# Patient Record
Sex: FEMALE | Race: WHITE | Employment: OTHER | URBAN - METROPOLITAN AREA
[De-identification: names, ages, dates, MRNs, and addresses within clinical notes are randomized per-mention and may not be internally consistent; named-entity substitution may affect disease eponyms.]

---

## 2020-09-01 LAB — DIABETIC RETINOPATHY: NEGATIVE

## 2021-03-17 LAB
AVERAGE GLUCOSE: ABNORMAL
HBA1C MFR BLD: 7.7 %

## 2021-04-08 DIAGNOSIS — E11.42 DIABETIC PERIPHERAL NEUROPATHY (HCC): ICD-10-CM

## 2021-04-08 DIAGNOSIS — Z29.9 DVT PROPHYLAXIS: ICD-10-CM

## 2021-04-08 DIAGNOSIS — E11.9 DIABETES MELLITUS WITH NO COMPLICATION (HCC): ICD-10-CM

## 2021-04-08 PROBLEM — E78.5 HYPERLIPIDEMIA: Status: ACTIVE | Noted: 2021-04-08

## 2021-04-08 PROBLEM — E55.9 VITAMIN D DEFICIENCY: Status: ACTIVE | Noted: 2021-04-08

## 2021-04-08 PROBLEM — R20.9 SENSATION OF COLD IN LOWER EXTREMITY: Status: ACTIVE | Noted: 2020-02-03

## 2021-04-08 PROBLEM — F41.9 ANXIETY: Status: ACTIVE | Noted: 2021-04-08

## 2021-04-08 PROBLEM — G47.00 INSOMNIA: Status: ACTIVE | Noted: 2021-04-08

## 2021-04-08 PROBLEM — I10 HYPERTENSION: Status: ACTIVE | Noted: 2021-04-08

## 2021-04-08 PROBLEM — T78.40XA ALLERGIES: Status: ACTIVE | Noted: 2021-04-08

## 2021-04-08 PROBLEM — E83.52 HYPERCALCEMIA: Status: ACTIVE | Noted: 2020-02-17

## 2021-04-08 PROBLEM — R32 URINARY INCONTINENCE: Status: ACTIVE | Noted: 2021-04-08

## 2021-04-08 PROBLEM — M19.90 OSTEOARTHROSIS: Status: ACTIVE | Noted: 2021-04-08

## 2021-04-08 RX ORDER — LATANOPROST 50 UG/ML
1 SOLUTION/ DROPS OPHTHALMIC NIGHTLY
COMMUNITY

## 2021-04-08 RX ORDER — ASPIRIN 81 MG/1
81 TABLET ORAL DAILY
COMMUNITY
End: 2022-10-04

## 2021-04-08 RX ORDER — LISINOPRIL AND HYDROCHLOROTHIAZIDE 20; 12.5 MG/1; MG/1
1 TABLET ORAL DAILY
COMMUNITY
Start: 2021-04-05 | End: 2022-10-04

## 2021-04-08 RX ORDER — FLUTICASONE PROPIONATE 50 MCG
1 SPRAY, SUSPENSION (ML) NASAL DAILY
COMMUNITY

## 2021-04-08 RX ORDER — CLONIDINE HYDROCHLORIDE 0.1 MG/1
0.1 TABLET ORAL DAILY PRN
COMMUNITY
Start: 2021-03-22 | End: 2021-04-12

## 2021-04-08 RX ORDER — MECLIZINE HYDROCHLORIDE 25 MG/1
25 TABLET ORAL PRN
COMMUNITY

## 2021-04-08 RX ORDER — IBUPROFEN 600 MG/1
600 TABLET ORAL EVERY 6 HOURS PRN
COMMUNITY
Start: 2021-04-05

## 2021-04-08 RX ORDER — CLONAZEPAM 0.5 MG/1
0.5 TABLET ORAL PRN
COMMUNITY
Start: 2020-11-16

## 2021-04-08 RX ORDER — LANCETS 33 GAUGE
EACH MISCELLANEOUS
COMMUNITY
Start: 2020-12-29 | End: 2022-03-17 | Stop reason: SDUPTHER

## 2021-04-08 RX ORDER — GLIMEPIRIDE 4 MG/1
4 TABLET ORAL 2 TIMES DAILY
COMMUNITY
End: 2021-04-12

## 2021-04-08 RX ORDER — METFORMIN HYDROCHLORIDE 500 MG/1
500 TABLET, EXTENDED RELEASE ORAL 2 TIMES DAILY
COMMUNITY
Start: 2020-06-29 | End: 2021-04-12 | Stop reason: SDUPTHER

## 2021-04-08 RX ORDER — CITALOPRAM 20 MG/1
20 TABLET ORAL DAILY
COMMUNITY
Start: 2021-04-05 | End: 2021-04-12

## 2021-04-12 ENCOUNTER — OFFICE VISIT (OUTPATIENT)
Dept: ENDOCRINOLOGY | Age: 71
End: 2021-04-12
Payer: MEDICARE

## 2021-04-12 VITALS
HEIGHT: 62 IN | OXYGEN SATURATION: 99 % | HEART RATE: 77 BPM | WEIGHT: 125.6 LBS | DIASTOLIC BLOOD PRESSURE: 98 MMHG | SYSTOLIC BLOOD PRESSURE: 160 MMHG | BODY MASS INDEX: 23.11 KG/M2

## 2021-04-12 DIAGNOSIS — I10 ESSENTIAL HYPERTENSION: ICD-10-CM

## 2021-04-12 DIAGNOSIS — E11.42 TYPE 2 DIABETES MELLITUS WITH PERIPHERAL NEUROPATHY (HCC): ICD-10-CM

## 2021-04-12 DIAGNOSIS — E78.5 DYSLIPIDEMIA ASSOCIATED WITH TYPE 2 DIABETES MELLITUS (HCC): ICD-10-CM

## 2021-04-12 DIAGNOSIS — E11.69 DYSLIPIDEMIA ASSOCIATED WITH TYPE 2 DIABETES MELLITUS (HCC): ICD-10-CM

## 2021-04-12 DIAGNOSIS — E11.65 UNCONTROLLED TYPE 2 DIABETES MELLITUS WITH HYPERGLYCEMIA (HCC): Primary | ICD-10-CM

## 2021-04-12 DIAGNOSIS — Z78.9 STATIN INTOLERANCE: ICD-10-CM

## 2021-04-12 DIAGNOSIS — E83.52 HYPERCALCEMIA: ICD-10-CM

## 2021-04-12 PROCEDURE — 99204 OFFICE O/P NEW MOD 45 MIN: CPT | Performed by: INTERNAL MEDICINE

## 2021-04-12 PROCEDURE — 3051F HG A1C>EQUAL 7.0%<8.0%: CPT | Performed by: INTERNAL MEDICINE

## 2021-04-12 RX ORDER — GLIPIZIDE 5 MG/1
5 TABLET ORAL
Qty: 60 TABLET | Refills: 3 | Status: SHIPPED | OUTPATIENT
Start: 2021-04-12 | End: 2021-08-16

## 2021-04-12 RX ORDER — METFORMIN HYDROCHLORIDE 500 MG/1
1000 TABLET, EXTENDED RELEASE ORAL 2 TIMES DAILY WITH MEALS
Qty: 120 TABLET | Refills: 5 | Status: SHIPPED | OUTPATIENT
Start: 2021-04-12 | End: 2021-10-21

## 2021-04-12 NOTE — PROGRESS NOTES
Patient ID:   So Conner is a 65 y.o. female    Chief Complaint:   So Conner presents for an initial evaluation of Type 2 Diabetes Mellitus , Hyperlipidemia and hypertension. Subjective:   Type 2 Diabetes Mellitus diagnosed in . Never been on insulin   Invokana caused confusion   Acots confused confusion   Januvia caused confusion     Not happy with diabetes care with pcp   Recently fired her PCP     Does not want to take insulin     Metformin ER 500mg with breakfast and dinner    Glimepiride 4mg twice a day     Checks blood sugars 2 times per day. Reviewed/Reported  AM: 130-158  Lunch:  Supper:   HS: 130-163    Hypoglycemias: None     Meals: three (dinner is bigger), crackers at bedtime. Snacks 2 per day (crackers, sweets (sugar free cookies), pizza rolls). Sometimes glucerna drinks for snacks. Drinks juices sometimes. Sometimes she has diet sodas. Exercise: She says she is always active. Denies chest pain, exertional dyspnea. Family history of CAD: Brother  of MI at ag 64. Both parents had stroke. Denies smoking/ alcohol.    Currently on ASA 81 mg daily     The following portions of the patient's history were reviewed and updated as appropriate:       Family History   Problem Relation Age of Onset    High Blood Pressure Mother     Stroke Mother     Stroke Father          Social History     Socioeconomic History    Marital status: Unknown     Spouse name: Not on file    Number of children: Not on file    Years of education: Not on file    Highest education level: Not on file   Occupational History    Not on file   Social Needs    Financial resource strain: Not on file    Food insecurity     Worry: Not on file     Inability: Not on file    Transportation needs     Medical: Not on file     Non-medical: Not on file   Tobacco Use    Smoking status: Never Smoker    Smokeless tobacco: Never Used   Substance and Sexual Activity    Alcohol use: Not Currently    Drug use: Never    Sexual activity: Not on file   Lifestyle    Physical activity     Days per week: Not on file     Minutes per session: Not on file    Stress: Not on file   Relationships    Social connections     Talks on phone: Not on file     Gets together: Not on file     Attends Advent service: Not on file     Active member of club or organization: Not on file     Attends meetings of clubs or organizations: Not on file     Relationship status: Not on file    Intimate partner violence     Fear of current or ex partner: Not on file     Emotionally abused: Not on file     Physically abused: Not on file     Forced sexual activity: Not on file   Other Topics Concern    Not on file   Social History Narrative    Not on file       Past Medical History:   Diagnosis Date    Anemia     Anxiety     Biliary colic     Diabetes [facility] complication [facility]     Diabetic peripheral neuropathy ([facility]     DVT prophylaxis     Gait abnormality     Glaucoma     History of calculus of gallbladder     Hypercalcemia     Hyperlipidemia     Hypertension     Hypertriglyceridemia     Insomnia     Osteoarthritis     Pelvic prolapse     PVD [facility]peripheral vascular disease) ([facility]     Sensation of cold in lower extremity     Stress bladder incontinence, female     Urinary incontinence     Urinary incontinence     Vitamin D deficiency        Past Surgical History:   Procedure Laterality Date    BLADDER SURGERY      CHOLECYSTECTOMY      DENTAL SURGERY      Root Canal    PARTIAL HYSTERECTOMY      VAGINA SURGERY           Allergies   Allergen Reactions    Latex Rash     red spots, no hives    Statins Hives, Shortness Of Breath and Swelling    Trazodone Hives    Canagliflozin      Other reaction(s): Other (See Comments)  Confusion    Escitalopram Hives    Ezetimibe Hives    Gabapentin      Other reaction(s):  Other (See Comments)  confusion    Lamotrigine Hives    Naltrexone Hives    Perphenazine      Other reaction(s): Other (See Comments)  unsure    Pioglitazone      Other reaction(s): Other (See Comments)  confusion    Pitavastatin Hives    Prednisone Hives    Sitagliptin      Other reaction(s): Other (See Comments)  confusion    Sulfamethoxazole-Trimethoprim Itching         Current Outpatient Medications:     metFORMIN (GLUCOPHAGE-XR) 500 MG extended release tablet, Take 2 tablets by mouth 2 times daily (with meals), Disp: 120 tablet, Rfl: 5    glipiZIDE (GLUCOTROL) 5 MG tablet, Take 1 tablet by mouth 2 times daily (before meals), Disp: 60 tablet, Rfl: 3    Multiple Vitamin (MULTIVITAMINS PO), Take by mouth daily, Disp: , Rfl:     Lancets (ONETOUCH DELICA PLUS [id number]) MISC, USE TO TEST TWO TO THREE TIMES A DAY, Disp: , Rfl:     aspirin 81 MG EC tablet, Take 81 mg by mouth daily, Disp: , Rfl:     Cholecalciferol 50 MCG (2000 UT) TABS, Take 1 capsule by mouth daily, Disp: , Rfl:     clonazePAM (KLONOPIN) 0.5 MG tablet, Take 0.5 mg by mouth as needed. , Disp: , Rfl:     fluticasone (FLONASE) 50 MCG/ACT nasal spray, 1 spray by Nasal route daily, Disp: , Rfl:     ibuprofen (ADVIL;MOTRIN) 600 MG tablet, Take 600 mg by mouth every 6 hours as needed, Disp: , Rfl:     latanoprost (XALATAN) 0.005 % ophthalmic solution, Apply 1 drop to eye nightly, Disp: , Rfl:     lisinopril-hydroCHLOROthiazide (PRINZIDE;ZESTORETIC) 20-12.5 MG per tablet, Take 1 tablet by mouth daily, Disp: , Rfl:     meclizine (ANTIVERT) 25 MG tablet, Take 25 mg by mouth as needed, Disp: , Rfl:     Melatonin 3 MG CAPS, Take 3 mg by mouth nightly, Disp: , Rfl:     nystatin (MYCOSTATIN) 095573 UNIT/ML suspension, Take 5 mLs by mouth 3 times daily, Disp: , Rfl:       Review of Systems:    Constitutional: Negative for fever, chills, and unexpected weight change. HENT: Negative for congestion, ear pain, rhinorrhea,  sore throat and trouble swallowing. Eyes: Negative for photophobia, redness, itching.    Respiratory: Negative for cough, shortness of breath and sputum. Cardiovascular: Negative for chest pain, palpitations and leg swelling. Gastrointestinal: Negative for nausea, vomiting, abdominal pain, diarrhea, constipation. Endocrine: Negative for cold intolerance, heat intolerance, polydipsia, polyphagia and polyuria. Genitourinary: Negative for dysuria, urgency, frequency, hematuria and flank pain. Musculoskeletal: Negative for myalgias, back pain, arthralgias and neck pain. Skin/Nail: Negative for rash, itching. Normal nails. Neurological: Negative for seizures, weakness, light-headedness, numbness and headaches. Hematological/ Lymph nodes: Negative for adenopathy. Does not bruise/bleed easily. Psychiatric/Behavioral: Has depression. Negative for suicidal ideas, anxiety, sleep disturbance and decreased concentration. Objective:   Physical Exam:  BP (!) 179/84 (Site: Left Upper Arm, Position: Sitting, Cuff Size: Small Adult)   Pulse 77   Ht 5' 2\" (1.575 m)   Wt 125 lb 9.6 oz (57 kg)   SpO2 99%   Breastfeeding No   BMI 22.97 kg/m²   Constitutional: Patient is oriented to person, place, and time. Patient appears well-developed and well-nourished. HENT:    Head: Normocephalic and atraumatic. Eyes: Conjunctivae and EOM are normal. Pupils are equal, round, and reactive to light. Neck: Normal range of motion. Cardiovascular: Normal rate, regular rhythm and normal heart sounds. Pulmonary/Chest: Effort normal and breath sounds normal.   Abdominal: Soft. Bowel sounds are normal.   Musculoskeletal: Normal range of motion. Neurological: Patient is alert and oriented to person, place, and time. Patient has normal reflexes. Skin: Skin is warm and dry. Psychiatric: Patient has a normal mood and affect.  Patient behavior is normal.     Lab Review:    Office Visit on 04/12/2021   Component Date Value Ref Range Status    Diabetic Retinopathy 09/01/2020 Negative   Final   Abstract on 04/08/2021   Component Date Value Ref Range Status    Hemoglobin A1C 03/17/2021 7.7  % Final           Assessment and Plan     Juventino Snowden was seen today for consultation and diabetes. Diagnoses and all orders for this visit:    Uncontrolled type 2 diabetes mellitus [facility] hypergly[facility]  -     metFORMIN (GLUCOPHAGE-XR) 500 MG extended release tablet; Take 2 tablets by mouth 2 times daily (with meals)  -     glipiZIDE (GLUCOTROL) 5 MG tablet; Take 1 tablet by mouth 2 times daily (before meals)  -     Comprehensive Metabolic Panel; Future  -     Vitamin D 25 Hydroxy; Future  -     PTH, Intact; Future  -     Microalbumin / Creatinine Urine Ratio; Future  -     Vitamin B12 & Folate; Future    Dyslipidemia associated with type 2 diabetes mellitus ([facility]    Essential hypertension  -     metFORMIN (GLUCOPHAGE-XR) 500 MG extended release tablet; Take 2 tablets by mouth 2 times daily (with meals)  -     glipiZIDE (GLUCOTROL) 5 MG tablet; Take 1 tablet by mouth 2 times daily (before meals)  -     Comprehensive Metabolic Panel; Future  -     Vitamin D 25 Hydroxy; Future  -     PTH, Intact; Future  -     Microalbumin / Creatinine Urine Ratio; Future  -     Vitamin B12 & Folate; Future    Statin intolerance    Hypercalcemia  -     metFORMIN (GLUCOPHAGE-XR) 500 MG extended release tablet; Take 2 tablets by mouth 2 times daily (with meals)  -     glipiZIDE (GLUCOTROL) 5 MG tablet; Take 1 tablet by mouth 2 times daily (before meals)  -     Comprehensive Metabolic Panel; Future  -     Vitamin D 25 Hydroxy; Future  -     PTH, Intact; Future  -     Microalbumin / Creatinine Urine Ratio; Future  -     Vitamin B12 & Folate; Future    Type 2 diabetes mellitus with peripheral neuropathy (HCC)  -     metFORMIN (GLUCOPHAGE-XR) 500 MG extended release tablet; Take 2 tablets by mouth 2 times daily (with meals)  -     glipiZIDE (GLUCOTROL) 5 MG tablet; Take 1 tablet by mouth 2 times daily (before meals)  -     Comprehensive Metabolic Panel;  Future  -     Vitamin D

## 2021-04-28 PROBLEM — R19.7 DIARRHEA: Status: ACTIVE | Noted: 2020-06-24

## 2021-05-07 ENCOUNTER — VIRTUAL VISIT (OUTPATIENT)
Dept: ENDOCRINOLOGY | Age: 71
End: 2021-05-07
Payer: MEDICARE

## 2021-05-07 DIAGNOSIS — E78.5 DYSLIPIDEMIA ASSOCIATED WITH TYPE 2 DIABETES MELLITUS (HCC): ICD-10-CM

## 2021-05-07 DIAGNOSIS — E11.65 UNCONTROLLED TYPE 2 DIABETES MELLITUS WITH HYPERGLYCEMIA (HCC): Primary | ICD-10-CM

## 2021-05-07 DIAGNOSIS — I10 ESSENTIAL HYPERTENSION: ICD-10-CM

## 2021-05-07 DIAGNOSIS — E11.69 DYSLIPIDEMIA ASSOCIATED WITH TYPE 2 DIABETES MELLITUS (HCC): ICD-10-CM

## 2021-05-07 PROCEDURE — 99442 PR PHYS/QHP TELEPHONE EVALUATION 11-20 MIN: CPT | Performed by: INTERNAL MEDICINE

## 2021-05-07 NOTE — PROGRESS NOTES
Mother     Stroke Father          Social History     Socioeconomic History    Marital status: Unknown     Spouse name: Not on file    Number of children: Not on file    Years of education: Not on file    Highest education level: Not on file   Occupational History    Not on file   Social Needs    Financial resource strain: Not on file    Food insecurity     Worry: Not on file     Inability: Not on file    Transportation needs     Medical: Not on file     Non-medical: Not on file   Tobacco Use    Smoking status: Never Smoker    Smokeless tobacco: Never Used   Substance and Sexual Activity    Alcohol use: Not Currently    Drug use: Never    Sexual activity: Not on file   Lifestyle    Physical activity     Days per week: Not on file     Minutes per session: Not on file    Stress: Not on file   Relationships    Social connections     Talks on phone: Not on file     Gets together: Not on file     Attends Yazdanism service: Not on file     Active member of club or organization: Not on file     Attends meetings of clubs or organizations: Not on file     Relationship status: Not on file    Intimate partner violence     Fear of current or ex partner: Not on file     Emotionally abused: Not on file     Physically abused: Not on file     Forced sexual activity: Not on file   Other Topics Concern    Not on file   Social History Narrative    Not on file       Past Medical History:   Diagnosis Date    Anemia     Anxiety     Biliary colic     [facility] mellitus with no complication [facility]     Diabetic peripheral neuropathy ([facility]     DVT prophylaxis     Gait abnormality     Glaucoma     History of calculus of gallbladder     Hypercalcemia     Hyperlipidemia     Hypertension     Hypertriglyceridemia     Insomnia     Osteoarthritis     Pelvic prolapse     PVD (peripheral vascular disease) ([facility]     Sensation of cold in lower extremity     Stress bladder incontinence, female     Urinary incontinence     Urinary incontinence     Vitamin D deficiency        Past Surgical History:   Procedure Laterality Date    BLADDER SURGERY      CHOLECYSTECTOMY      DENTAL SURGERY      Root Canal    PARTIAL HYSTERECTOMY      VAGINA SURGERY           Allergies   Allergen Reactions    Latex Rash     red spots, no hives    Statins Hives, Shortness Of Breath and Swelling    Trazodone Hives    Canagliflozin      Other reaction(s): Other (See Comments)  Confusion    Escitalopram Hives    Ezetimibe Hives    Gabapentin      Other reaction(s): Other (See Comments)  confusion    Lamotrigine Hives    Naltrexone Hives    Perphenazine      Other reaction(s): Other (See Comments)  unsure    Pioglitazone      Other reaction(s): Other (See Comments)  confusion    Pitavastatin Hives    Prednisone Hives    Sitagliptin      Other reaction(s): Other (See Comments)  confusion    Sulfamethoxazole-Trimethoprim Itching         Current Outpatient Medications:     metFORMIN (GLUCOPHAGE-XR) 500 MG extended release tablet, Take 2 tablets by mouth 2 times daily (with meals), Disp: 120 tablet, Rfl: 5    glipiZIDE (GLUCOTROL) 5 MG tablet, Take 1 tablet by mouth 2 times daily (before meals), Disp: 60 tablet, Rfl: 3    Multiple Vitamin (MULTIVITAMINS PO), Take by mouth daily, Disp: , Rfl:     Lancets (ONETOUCH DELICA PLUS [id number]) MISC, USE TO TEST TWO TO THREE TIMES A DAY, Disp: , Rfl:     aspirin 81 MG EC tablet, Take 81 mg by mouth daily, Disp: , Rfl:     Cholecalciferol 50 MCG (2000 UT) TABS, Take 1 capsule by mouth daily, Disp: , Rfl:     clonazePAM (KLONOPIN) 0.5 MG tablet, Take 0.5 mg by mouth as needed.  , Disp: , Rfl:     fluticasone (FLONASE) 50 MCG/ACT nasal spray, 1 spray by Nasal route daily, Disp: , Rfl:     ibuprofen (ADVIL;MOTRIN) 600 MG tablet, Take 600 mg by mouth every 6 hours as needed, Disp: , Rfl:     latanoprost (XALATAN) 0.005 % ophthalmic solution, Apply 1 drop to eye nightly, Disp: , Rfl:   lisinopril-hydroCHLOROthiazide (PRINZIDE;ZESTORETIC) 20-12.5 MG per tablet, Take 1 tablet by mouth daily, Disp: , Rfl:     meclizine (ANTIVERT) 25 MG tablet, Take 25 mg by mouth as needed, Disp: , Rfl:     Melatonin 3 MG CAPS, Take 3 mg by mouth nightly, Disp: , Rfl:     nystatin (MYCOSTATIN) [id number] UNIT/ML suspension, Take 5 mLs by mouth 3 times daily, Disp: , Rfl:       Review of Systems:    Constitutional: Negative for fever, chills, and unexpected weight change. HENT: Negative for congestion, ear pain, rhinorrhea,  sore throat and trouble swallowing. Eyes: Negative for photophobia, redness, itching. Respiratory: Negative for cough, shortness of breath and sputum. Cardiovascular: Negative for chest pain, palpitations and leg swelling. Gastrointestinal: Negative for nausea, vomiting, abdominal pain, diarrhea, constipation. Endocrine: Negative for cold intolerance, heat intolerance, polydipsia, polyphagia and polyuria. Genitourinary: Negative for dysuria, urgency, frequency, hematuria and flank pain. Musculoskeletal: Negative for myalgias, back pain, arthralgias and neck pain. Skin/Nail: Negative for rash, itching. Normal nails. Neurological: Negative for seizures, weakness, light-headedness, numbness and headaches. Hematological/ Lymph nodes: Negative for adenopathy. Does not bruise/bleed easily. Psychiatric/Behavioral: Has depression. Negative for suicidal ideas, anxiety, sleep disturbance and decreased concentration. Objective:   Physical Exam:  There were no vitals taken for this visit. Lab Review:    Office Visit on 04/12/2021   Component Date Value Ref Range Status    Diabetic Retinopathy 09/01/2020 Negative   Final   Abstract on 04/08/2021   Component Date Value Ref Range Status    Hemoglobin A1C 03/17/2021 7.7  % Final           Assessment and Plan     Brittany Nelson was seen today for diabetes.     Diagnoses and all orders for this visit:    Uncontrolled type 2 diabetes mellitus with hyperglycemia (HCC)  -     Hemoglobin A1C; Future    Dyslipidemia associated with type 2 diabetes mellitus ([facility]    Essential hypertension          1: Type 2 DM complicated with hyperglycemia   Uncontrolled A1C 7.7% March 17th 2021      Blood sugars look in a decent range   She made improvements in her diet     C/w Metformin ER 500mg, two tabs with breakfast and dinner    Glipizide 5 mg with breakfast and dinner    All instructions provided in written. Check Blood sugars 2 times per day. Log them along with insulin and send them every 2 weeks. Call for blood sugars less than 60 or more than 400. Eye exam: Everything 3 months denies DR. Brigette Joel Glaucoma. Foot exam:  April 2021 . Neuropathic pain at night. Deformity/amputation: absent  Skin lesions/pre-ulcerative calluses: absent  Edema: right- negative, left- negative  Sensory exam: Monofilament sensation: normal  Pulses: normal, Vibration (128 Hz): severely reduced     April 2021: Normal B12 and folate for neuropathy     Renal screen: later   TSH screen:March 2021     2: HTN   Controlled     3: Hyperlipidemia   LDL: 161 , HDL: 52 , TGs: 187 March 2021    allergic to statins (crestor, pitavastatin pravachol) and zetia   Does not want tot take statins     4: Hypercalcemia   Noted in the labs   Never had kidney stones   10.1 -11.4 since Jan 2020   Ca: 10.8, PTH 23 April 2021   Vit D 71 - April 2021 . She takes Vit D 2000 units twice a day. Change it once a day. She is on HCTZ 12.5mg   Takes Vit D 4,000 units daily     Check CMP in 6 months , fall - winter 2021     RTC in 6 weeks with logs , A1C   She does labs at [facility]     A total of 15 minutes was spent conversing with the patient and over half of that time was spent counseling the patient on endocrine related medical issues.        EDUCATION:   Greater than 50% of this visit was spent in general counseling regarding obesity, diet, exercise, importance of adherence to insulin regime, recognition and treatment of hypo and hyperglycemia,  glucose logging, proper diabetes management, diabetic complications with poor management and the importance of glycemic control in order to avoid the complications of diabetes. Risks and potential complications of diabetes were reviewed with the patient. Diabetes health maintenance plan and follow-up were discussed and understood by the patient. We reviewed the importance of medication compliance and regular follow-up. Aggressive lifestyle modification was encouraged. Exercise Counselling: This patient is a candidate for regular physical exercise. Instructions to perform the following types of exercise:  Swimming or water aerobic exercise  Brisk walking  Playing tennis  Stationary bicycle or elliptical indoor  Low impact aerobic exercise    Instructions given to exercise for the following duration:  30 minutes a day for five-seven days per week.     Following instructions for being active throughout the day in addition to formal exercise:  Walk instead of drive whenever possible  Take the stairs instead of the elevator  Work in the garden  Park to the far end of the parking lot to add more walking steps to destination      Electronically signed by Rusty Evans MD on 5/7/2021 at 12:18 PM

## 2021-07-02 ENCOUNTER — OFFICE VISIT (OUTPATIENT)
Dept: ENDOCRINOLOGY | Age: 71
End: 2021-07-02
Payer: MEDICARE

## 2021-07-02 VITALS
OXYGEN SATURATION: 97 % | TEMPERATURE: 97.2 F | HEIGHT: 62 IN | RESPIRATION RATE: 14 BRPM | WEIGHT: 121.6 LBS | DIASTOLIC BLOOD PRESSURE: 83 MMHG | SYSTOLIC BLOOD PRESSURE: 147 MMHG | HEART RATE: 80 BPM | BODY MASS INDEX: 22.38 KG/M2

## 2021-07-02 DIAGNOSIS — E11.69 DYSLIPIDEMIA ASSOCIATED WITH TYPE 2 DIABETES MELLITUS (HCC): ICD-10-CM

## 2021-07-02 DIAGNOSIS — I10 ESSENTIAL HYPERTENSION: ICD-10-CM

## 2021-07-02 DIAGNOSIS — E78.5 DYSLIPIDEMIA ASSOCIATED WITH TYPE 2 DIABETES MELLITUS (HCC): ICD-10-CM

## 2021-07-02 DIAGNOSIS — E11.65 UNCONTROLLED TYPE 2 DIABETES MELLITUS WITH HYPERGLYCEMIA (HCC): Primary | ICD-10-CM

## 2021-07-02 DIAGNOSIS — Z78.9 STATIN INTOLERANCE: ICD-10-CM

## 2021-07-02 DIAGNOSIS — E83.52 HYPERCALCEMIA: ICD-10-CM

## 2021-07-02 PROCEDURE — 99214 OFFICE O/P EST MOD 30 MIN: CPT | Performed by: INTERNAL MEDICINE

## 2021-07-02 PROCEDURE — 3051F HG A1C>EQUAL 7.0%<8.0%: CPT | Performed by: INTERNAL MEDICINE

## 2021-07-02 RX ORDER — CYCLOBENZAPRINE HCL 10 MG
10 TABLET ORAL DAILY PRN
COMMUNITY
Start: 2021-06-28 | End: 2021-11-16

## 2021-07-02 NOTE — PROGRESS NOTES
 Not on file   Social History Narrative    Not on file     Social Determinants of Health     Financial Resource Strain:     Difficulty of Paying Living Expenses:    Food Insecurity:     Worried About Running Out of Food in the Last Year:     [address] in the Last Year:    Transportation Needs:     Lack of Transportation (Medical):      Lack of Transportation (Non-Medical):    Physical Activity:     Days of Exercise per Week:     Minutes of Exercise per Session:    Stress:     Feeling of Stress :    Social Connections:     Frequency of Communication with Friends and Family:     Frequency of Social Gatherings with Friends and Family:     Attends Rastafari Services:     Active Member of Clubs or Organizations:     Attends Club or Organization Meetings:     Marital Status:    Intimate Partner Violence:     Fear of Current or Ex-Partner:     Emotionally Abused:     Physically Abused:     Sexually Abused:        Past Medical History:   Diagnosis Date    Anemia     Anxiety     Biliary colic     Diabetes mellitus with no complication ([address]     Diabetic peripheral [facility]     DVT prophylaxis     Gait abnormality     Glaucoma     History of calculus of gallbladder     Hypercalcemia     Hyperlipidemia     Hypertension     Hypertriglyceridemia     Insomnia     Osteoarthritis     Pelvic prolapse     PVD (peripheral vascular disease) ([facility]     Sensation of cold in lower extremity     Stress bladder incontinence, female     Urinary incontinence     Urinary incontinence     Vitamin D deficiency        Past Surgical History:   Procedure Laterality Date    BLADDER SURGERY      CHOLECYSTECTOMY      DENTAL SURGERY      Root Canal    PARTIAL HYSTERECTOMY      VAGINA SURGERY           Allergies   Allergen Reactions    Latex Rash     red spots, no hives    Statins Hives, Shortness Of Breath, Swelling and Anaphylaxis    Trazodone Hives    Canagliflozin      Other reaction(s): Other (See Comments)  Confusion    Escitalopram Hives    Ezetimibe Hives    Gabapentin      Other reaction(s): Other (See Comments)  confusion    Lamotrigine Hives    Naltrexone Hives    Other      Cigarette smoke    Perphenazine      Other reaction(s): Other (See Comments)  unsure    Pioglitazone      Other reaction(s): Other (See Comments)  confusion    Pitavastatin Hives    Prednisone Hives    Sitagliptin      Other reaction(s): Other (See Comments)  confusion    Sulfamethoxazole-Trimethoprim Itching         Current Outpatient Medications:     cyclobenzaprine (FLEXERIL) 10 MG tablet, Take 10 mg by mouth daily as needed, Disp: , Rfl:     metFORMIN (GLUCOPHAGE-XR) 500 MG extended release tablet, Take 2 tablets by mouth 2 times daily (with meals), Disp: 120 tablet, Rfl: 5    glipiZIDE (GLUCOTROL) 5 MG tablet, Take 1 tablet by mouth 2 times daily (before meals), Disp: 60 tablet, Rfl: 3    Multiple Vitamin (MULTIVITAMINS PO), Take by mouth daily, Disp: , Rfl:     Lancets (ONETOUCH DELICA PLUS CFUPBF25E) MISC, USE TO TEST TWO TO THREE TIMES A DAY, Disp: , Rfl:     aspirin 81 MG EC tablet, Take 81 mg by mouth daily, Disp: , Rfl:     Cholecalciferol 50 MCG (2000 UT) TABS, Take 1 capsule by mouth daily, Disp: , Rfl:     clonazePAM (KLONOPIN) 0.5 MG tablet, Take 0.5 mg by mouth as needed.  , Disp: , Rfl:     fluticasone (FLONASE) 50 MCG/ACT nasal spray, 1 spray by Nasal route daily, Disp: , Rfl:     ibuprofen (ADVIL;MOTRIN) 600 MG tablet, Take 600 mg by mouth every 6 hours as needed, Disp: , Rfl:     latanoprost (XALATAN) 0.005 % ophthalmic solution, Apply 1 drop to eye nightly, Disp: , Rfl:     lisinopril-hydroCHLOROthiazide (PRINZIDE;ZESTORETIC) 20-12.5 MG per tablet, Take 1 tablet by mouth daily, Disp: , Rfl:     meclizine (ANTIVERT) 25 MG tablet, Take 25 mg by mouth as needed, Disp: , Rfl:     Melatonin 3 MG CAPS, Take 3 mg by mouth nightly, Disp: , Rfl:     nystatin (MYCOSTATIN) 682929 UNIT/ML suspension, Take 5 mLs by mouth 3 times daily, Disp: , Rfl:       Review of Systems:    Constitutional: Negative for fever, chills, and unexpected weight change. HENT: Negative for congestion, ear pain, rhinorrhea,  sore throat and trouble swallowing. Eyes: Negative for photophobia, redness, itching. Respiratory: Negative for cough, shortness of breath and sputum. Cardiovascular: Negative for chest pain, palpitations and leg swelling. Gastrointestinal: Negative for nausea, vomiting, abdominal pain, diarrhea, constipation. Endocrine: Negative for cold intolerance, heat intolerance, polydipsia, polyphagia and polyuria. Genitourinary: Negative for dysuria, urgency, frequency, hematuria and flank pain. Musculoskeletal: Negative for myalgias, back pain, arthralgias and neck pain. Skin/Nail: Negative for rash, itching. Normal nails. Neurological: Negative for seizures, weakness, light-headedness, numbness and headaches. Hematological/ Lymph nodes: Negative for adenopathy. Does not bruise/bleed easily. Psychiatric/Behavioral: Has depression. Negative for suicidal ideas, anxiety, sleep disturbance and decreased concentration. Objective:   Physical Exam:  BP (!) 147/83   Pulse 80   Temp 97.2 °F (36.2 °C)   Resp 14   Ht 5' 2\" (1.575 m)   Wt 121 lb 9.6 oz (55.2 kg)   SpO2 97%   BMI 22.24 kg/m²       Lab Review:    Office Visit on 04/12/2021   Component Date Value Ref Range Status    Diabetic Retinopathy 09/01/2020 Negative   Final   Abstract on 04/08/2021   Component Date Value Ref Range Status    Hemoglobin A1C 03/17/2021 7.7  % Final           Assessment and Plan     Gisela Steele was seen today for diabetes. Diagnoses and all orders for this visit:    Uncontrolled type 2 diabetes mellitus with hyperglycemia ([facility]  -     Hemoglobin A1C; Future  -     Comprehensive Metabolic Panel; Future  -     Microalbumin / Creatinine Urine Ratio;  Future  -     Vitamin D 25 Hydroxy; Future  -     PTH, Intact; Future    Dyslipidemia associated with type 2 diabetes mellitus ([facility]  -     Hemoglobin A1C; Future  -     Comprehensive Metabolic Panel; Future  -     Microalbumin / Creatinine Urine Ratio; Future  -     Vitamin D 25 Hydroxy; Future  -     PTH, Intact; Future    Essential hypertension  -     Hemoglobin A1C; Future  -     Comprehensive Metabolic Panel; Future  -     Microalbumin / Creatinine Urine Ratio; Future  -     Vitamin D 25 Hydroxy; Future  -     PTH, Intact; Future    Statin intolerance  -     Hemoglobin A1C; Future  -     Comprehensive Metabolic Panel; Future  -     Microalbumin / Creatinine Urine Ratio; Future  -     Vitamin D 25 Hydroxy; Future  -     PTH, Intact; Future    Hypercalcemia  -     Hemoglobin A1C; Future  -     Comprehensive Metabolic Panel; Future  -     Microalbumin / Creatinine Urine Ratio; Future  -     Vitamin D 25 Hydroxy; Future  -     PTH, Intact; Future          1: Type 2 DM complicated with hyperglycemia   Controlled A1C 7.5% < 7.7% March 17th 2021      Blood sugars look in a decent range   Some numbers are high in am  May go up on evening dose of glipizide later     C/w Metformin ER 500mg, two tabs with breakfast and dinner    Glipizide 5 mg with breakfast and dinner    All instructions provided in written. Check Blood sugars 2 times per day. Log them along with insulin and send them every 2 weeks. Call for blood sugars less than 60 or more than 400. Eye exam: Everything 3 months denies DR. Aydin Benites Glaucoma. Foot exam:  April 2021 . Neuropathic pain at night.    Deformity/amputation: absent  Skin lesions/pre-ulcerative calluses: absent  Edema: right- negative, left- negative  Sensory exam: Monofilament sensation: normal  Pulses: normal, Vibration (128 Hz): severely reduced     April 2021: Normal B12 and folate for neuropathy     Renal screen: later   TSH screen:March 2021     2: HTN   [facility] as [facility]      3: Hyperlipidemia   LDL: 161 , HDL: 52 , TGs: 187 March 2021    Allergic to statins (crestor, pitavastatin pravachol) and zetia   Does not want to take statins     4: Hypercalcemia   Noted in the labs   Never had kidney stones   10.1 -11.4 since Jan 2020   Ca: 10.8, PTH 23 April 2021   Vit D 69 - April 2021 . She is on HCTZ 12.5mg   Takes Vit D 5,000 units three days a week      Check CMP in 6 months , fall - winter 2021     RTC in 4 months with logs , A1C, CMP, Vit D , PTH, MACR      She does labs at [facility]:   Greater than 50% of this visit was spent in general counseling regarding obesity, diet, exercise, importance of adherence to insulin regime, recognition and treatment of hypo and hyperglycemia,  glucose logging, proper diabetes management, diabetic complications with poor management and the importance of glycemic control in order to avoid the complications of diabetes. Risks and potential complications of diabetes were reviewed with the patient. Diabetes health maintenance plan and follow-up were discussed and understood by the patient. We reviewed the importance of medication compliance and regular follow-up. Aggressive lifestyle modification was encouraged. Exercise Counselling: This patient is a candidate for regular physical exercise. Instructions to perform the following types of exercise:  Swimming or water aerobic exercise  Brisk walking  Playing tennis  Stationary bicycle or elliptical indoor  Low impact aerobic exercise    Instructions given to exercise for the following duration:  30 minutes a day for five-seven days per week.     Following instructions for being active throughout the day in addition to formal exercise:  Walk instead of drive whenever possible  Take the stairs instead of the elevator  Work in the garden  Park to the far end of the parking lot to add more walking steps to destination      Electronically signed by Dia Valencia MD on 7/2/2021 at 11:48 AM

## 2021-10-21 DIAGNOSIS — E11.42 TYPE 2 DIABETES MELLITUS WITH PERIPHERAL NEUROPATHY (HCC): ICD-10-CM

## 2021-10-21 DIAGNOSIS — E11.65 UNCONTROLLED TYPE 2 DIABETES MELLITUS WITH HYPERGLYCEMIA (HCC): ICD-10-CM

## 2021-10-21 DIAGNOSIS — I10 ESSENTIAL HYPERTENSION: ICD-10-CM

## 2021-10-21 DIAGNOSIS — E83.52 HYPERCALCEMIA: ICD-10-CM

## 2021-10-21 RX ORDER — METFORMIN HYDROCHLORIDE 500 MG/1
TABLET, EXTENDED RELEASE ORAL
Qty: 120 TABLET | Refills: 5 | Status: SHIPPED | OUTPATIENT
Start: 2021-10-21 | End: 2022-03-17 | Stop reason: SDUPTHER

## 2021-11-01 PROBLEM — J30.2 SEASONAL ALLERGIC RHINITIS: Status: ACTIVE | Noted: 2021-06-16

## 2021-11-17 ENCOUNTER — OFFICE VISIT (OUTPATIENT)
Dept: ENDOCRINOLOGY | Age: 71
End: 2021-11-17
Payer: MEDICARE

## 2021-11-17 VITALS
HEART RATE: 84 BPM | DIASTOLIC BLOOD PRESSURE: 82 MMHG | OXYGEN SATURATION: 99 % | WEIGHT: 124 LBS | BODY MASS INDEX: 22.82 KG/M2 | SYSTOLIC BLOOD PRESSURE: 170 MMHG | HEIGHT: 62 IN

## 2021-11-17 DIAGNOSIS — E11.69 DYSLIPIDEMIA ASSOCIATED WITH TYPE 2 DIABETES MELLITUS (HCC): ICD-10-CM

## 2021-11-17 DIAGNOSIS — E83.52 HYPERCALCEMIA: ICD-10-CM

## 2021-11-17 DIAGNOSIS — E78.5 DYSLIPIDEMIA ASSOCIATED WITH TYPE 2 DIABETES MELLITUS (HCC): ICD-10-CM

## 2021-11-17 DIAGNOSIS — Z78.9 STATIN INTOLERANCE: ICD-10-CM

## 2021-11-17 DIAGNOSIS — E11.42 TYPE 2 DIABETES MELLITUS WITH PERIPHERAL NEUROPATHY (HCC): Primary | ICD-10-CM

## 2021-11-17 DIAGNOSIS — I10 ESSENTIAL HYPERTENSION: ICD-10-CM

## 2021-11-17 PROCEDURE — 3051F HG A1C>EQUAL 7.0%<8.0%: CPT | Performed by: INTERNAL MEDICINE

## 2021-11-17 PROCEDURE — 99214 OFFICE O/P EST MOD 30 MIN: CPT | Performed by: INTERNAL MEDICINE

## 2021-11-17 RX ORDER — CLONIDINE HYDROCHLORIDE 0.1 MG/1
0.1 TABLET ORAL PRN
COMMUNITY
End: 2022-10-04 | Stop reason: ALTCHOICE

## 2021-11-17 RX ORDER — ACETAMINOPHEN 160 MG
TABLET,DISINTEGRATING ORAL
COMMUNITY
End: 2022-10-04 | Stop reason: ALTCHOICE

## 2021-11-17 RX ORDER — AMLODIPINE BESYLATE 10 MG/1
10 TABLET ORAL DAILY
Qty: 90 TABLET | Refills: 1 | Status: SHIPPED | OUTPATIENT
Start: 2021-11-17 | End: 2022-03-17

## 2021-11-17 NOTE — PROGRESS NOTES
Patient ID:   Ramesh Carter is a 65 y.o. female    Chief Complaint:   Ramesh Carter presents for an evaluation of Type 2 Diabetes Mellitus , Hyperlipidemia and hypertension. Subjective:   Type 2 Diabetes Mellitus diagnosed in . Never been on insulin   Invokana caused confusion   Actos confused confusion   Nareshuvia caused confusion     Not happy with diabetes care with pcp   Recently fired her PCP     Does not want to take insulin     Metformin ER 500mg, two tabs with breakfast and dinner    Glipizide 5 mg with breakfast and dinner    She is using boost instead of snacks, breads, mac n cheese     Checks blood sugars 1.4 times per day. Reviewed meter: , [address] AM:  [id number]  Lunch:  Supper:   HS:  225     Hypoglycemias: None     Meals: three (dinner is bigger), crackers at bedtime. Snacks 2 per day (crackers, sweets (sugar free cookies), pizza rolls). Sometimes glucerna drinks for snacks. Drinks juices sometimes. Sometimes she has diet sodas. Exercise: She says she is always active. Denies chest pain, exertional dyspnea. Family history of CAD: Brother  of MI at ag 64. Both parents had stroke. Denies smoking/ alcohol.    Currently on ASA 81 mg daily     The following portions of the patient's history were reviewed and updated as appropriate:       Family History   Problem Relation Age of Onset    High Blood Pressure Mother     Stroke Mother     Stroke Father          Social History     Socioeconomic History    Marital status: Unknown     Spouse name: Not on file    Number of children: Not on file    Years of education: Not on file    Highest education level: Not on file   Occupational History    Not on file   Tobacco Use    Smoking status: Never Smoker    Smokeless tobacco: Never Used   Vaping Use    Vaping Use: Never used   Substance and Sexual Activity    Alcohol use: Not Currently    Drug use: Never    Sexual activity: Not on file   Other Topics Concern    Not on file   Social History Narrative    Not on file     Social Determinants of Health     Financial Resource Strain:     Difficulty of Paying Living Expenses: Not on file   Food Insecurity:     Worried About Running Out of Food in the Last Year: Not on file    Kassandra of Food in the Last Year: Not on file   Transportation Needs:     Lack of Transportation (Medical): Not on file    Lack of Transportation (Non-Medical):  Not on file   Physical Activity:     Days of Exercise per Week: Not on file    Minutes of Exercise per Session: Not on file   Stress:     Feeling of Stress : Not on file   Social Connections:     Frequency of Communication with Friends and Family: Not on file    Frequency of Social Gatherings with Friends and Family: Not on file    Attends Yarsani Services: Not on file    Active Member of [address] [place] [address] [employer] or Organizations: Not on file    Attends Club or Organization Meetings: Not on file    Marital Status: Not on file   Intimate Partner Violence:     Fear of Current or Ex-Partner: Not on file    Emotionally Abused: Not on file    Physically Abused: Not on file    Sexually Abused: Not on file   Housing Stability:     Unable to Pay for Housing in the Last Year: Not on file    Number of [place] in the Last Year: Not on file    Unstable Housing in the Last Year: Not on file       Past Medical History:   Diagnosis Date    Anemia     Anxiety     Biliary colic     [facility]     Diabetic peripheral neuropathy [facility]     DVT prophylaxis     Gait abnormality     Glaucoma     History of calculus of gallbladder     Hypercalcemia     Hyperlipidemia     Hypertension     Hypertriglyceridemia     Insomnia     Osteoarthritis     Pelvic prolapse     PVD (peripheral vascular disease) [facility]     Sensation of cold in lower extremity     Stress bladder incontinence, female     Urinary incontinence     Urinary incontinence     Vitamin D deficiency Past Surgical History:   Procedure Laterality Date    BLADDER SURGERY      CHOLECYSTECTOMY      DENTAL SURGERY      Root Canal    PARTIAL HYSTERECTOMY      VAGINA SURGERY           Allergies   Allergen Reactions    Latex Rash     red spots, no hives    Statins Hives, Shortness Of Breath, Swelling and Anaphylaxis    Trazodone Hives    Canagliflozin      Other reaction(s): Other (See Comments)  Confusion    Escitalopram Hives    Ezetimibe Hives    Gabapentin      Other reaction(s): Other (See Comments)  confusion    Lamotrigine Hives    Naltrexone Hives    Other      Cigarette smoke    Perphenazine      Other reaction(s): Other (See Comments)  unsure    Pioglitazone      Other reaction(s): Other (See Comments)  confusion    Pitavastatin Hives    Prednisone Hives    Sitagliptin      Other reaction(s): Other (See Comments)  confusion    Sulfamethoxazole-Trimethoprim Itching         Current Outpatient Medications:     Cholecalciferol (VITAMIN D3) 50 MCG (2000 UT) CAPS, Take by mouth Once or twice weekly, Disp: , Rfl:     cloNIDine (CATAPRES) 0.1 MG tablet, Take 0.1 mg by mouth as needed for High Blood Pressure, Disp: , Rfl:     amLODIPine (NORVASC) 10 MG tablet, Take 1 tablet by mouth daily, Disp: 90 tablet, Rfl: 1    glipiZIDE (GLUCOTROL) 5 MG tablet, TAKE ONE TABLET BY MOUTH TWICE A DAY BEFORE MEALS, Disp: 60 tablet, Rfl: 2    metFORMIN (GLUCOPHAGE-XR) 500 MG extended release tablet, TAKE TWO TABLETS BY MOUTH TWICE A DAY WITH MEALS, Disp: 120 tablet, Rfl: 5    Multiple Vitamin (MULTIVITAMINS PO), Take by mouth daily, Disp: , Rfl:     Lancets (ONETOUCH DELICA PLUS [id number]) MISC, USE TO TEST TWO TO THREE TIMES A DAY, Disp: , Rfl:     aspirin 81 MG EC tablet, Take 81 mg by mouth daily, Disp: , Rfl:     clonazePAM (KLONOPIN) 0.5 MG tablet, Take 0.5 mg by mouth as needed.  , Disp: , Rfl:     fluticasone (FLONASE) 50 MCG/ACT nasal spray, 1 spray by Nasal route daily, Disp: , Rfl:    ibuprofen (ADVIL;MOTRIN) 600 MG tablet, Take 600 mg by mouth every 6 hours as needed, Disp: , Rfl:     latanoprost (XALATAN) 0.005 % ophthalmic solution, Apply 1 drop to eye nightly, Disp: , Rfl:     lisinopril-hydroCHLOROthiazide (PRINZIDE;ZESTORETIC) 20-12.5 MG per tablet, Take 1 tablet by mouth daily, Disp: , Rfl:     meclizine (ANTIVERT) 25 MG tablet, Take 25 mg by mouth as needed, Disp: , Rfl:       Review of Systems:    Constitutional: Negative for fever, chills, and unexpected weight change. HENT: Negative for congestion, ear pain, rhinorrhea,  sore throat and trouble swallowing. Eyes: Negative for photophobia, redness, itching. Respiratory: Negative for cough, shortness of breath and sputum. Cardiovascular: Negative for chest pain, palpitations and leg swelling. Gastrointestinal: Negative for nausea, vomiting, abdominal pain, diarrhea, constipation. Endocrine: Negative for cold intolerance, heat intolerance, polydipsia, polyphagia and polyuria. Genitourinary: Negative for dysuria, urgency, frequency, hematuria and flank pain. Musculoskeletal: Negative for myalgias, back pain, arthralgias and neck pain. Skin/Nail: Negative for rash, itching. Normal nails. Neurological: Negative for seizures, weakness, light-headedness, numbness and headaches. Hematological/ Lymph nodes: Negative for adenopathy. Does not bruise/bleed easily. Psychiatric/Behavioral: Has depression. Negative for suicidal ideas, anxiety, sleep disturbance and decreased concentration.           Objective:   Physical Exam:  BP (!) 180/88 (Site: Right Upper Arm, Position: Sitting, Cuff Size: Medium Adult)   Pulse 84   Ht 5' 2\" (1.575 m)   Wt 124 lb (56.2 kg)   SpO2 99%   BMI 22.68 kg/m²       Lab Review:    Office Visit on 04/12/2021   Component Date Value Ref Range Status    Diabetic Retinopathy 09/01/2020 Negative   Final   Abstract on 04/08/2021   Component Date Value Ref Range Status    Hemoglobin A1C 03/17/2021 7.7  % Final           Assessment and Plan     Alexa Rudolph was seen today for diabetes. Diagnoses and all orders for this visit:    Type 2 diabetes mellitus with peripheral neuropathy (HCC)  -     amLODIPine (NORVASC) 10 MG tablet; Take 1 tablet by mouth daily  -     Hemoglobin A1C; Future  -     Lipid, Fasting; Future  -     Comprehensive Metabolic Panel; Future    Essential hypertension  -     amLODIPine (NORVASC) 10 MG tablet; Take 1 tablet by mouth daily  -     Hemoglobin A1C; Future  -     Lipid, Fasting; Future  -     Comprehensive Metabolic Panel; Future    Hypercalcemia  -     amLODIPine (NORVASC) 10 MG tablet; Take 1 tablet by mouth daily  -     Hemoglobin A1C; Future  -     Lipid, Fasting; Future  -     Comprehensive Metabolic Panel; Future    Dyslipidemia associated with type 2 diabetes mellitus (HCC)  -     amLODIPine (NORVASC) 10 MG tablet; Take 1 tablet by mouth daily  -     Hemoglobin A1C; Future  -     Lipid, Fasting; Future  -     Comprehensive Metabolic Panel; Future    Statin intolerance  -     amLODIPine (NORVASC) 10 MG tablet; Take 1 tablet by mouth daily  -     Hemoglobin A1C; Future  -     Lipid, Fasting; Future  -     Comprehensive Metabolic Panel; Future          1: Type 2 DM complicated with hyperglycemia   Controlled A1C 7.5%< 7.5% < 7.7%        Blood sugars look in a decent range   Some numbers are high in am    C/w Metformin ER 500mg, two tabs with breakfast and dinner    Glipizide 5 mg with breakfast and dinner    All instructions provided in written. Check Blood sugars 2 times per day. Log them along with insulin and send them every 2 weeks. Call for blood sugars less than 60 or more than 400. Eye exam: Everything 3 months denies DR. Chung Major Glaucoma present. Foot exam:  April 2021 . Neuropathic pain at night.    Deformity/amputation: absent  Skin lesions/pre-ulcerative calluses: absent  Edema: right- negative, left- negative  Sensory exam: Monofilament sensation: normal  Pulses: normal, Vibration (128 Hz): severely reduced     April 2021: Normal B12 and folate      Renal screen: Oct 2021    TSH screen:March 2021     2: HTN   High today as she is stressed out with daughter had MI    Start amlodipine 10 mg daily     3: Hyperlipidemia   LDL: 161 , HDL: 52 , TGs: 187 March 2021    Allergic to statins (crestor, pitavastatin pravachol) and zetia   Does not want to take statins     4: Hypercalcemia   Noted in the labs   Never had kidney stones   10.1 -11.4 since Jan 2020   Ca: 10.8, PTH 23 April 2021   Vit D 69 - April 2021 . Ca 10.3, PTH 36.6, Vit D 42 - Oct 2021   She is on HCTZ 12.5mg   Takes Vit D 5,000 units three days a week         RTC in 4 months with logs , A1C, CMP, lipids     She does labs at [address] [place] [address]:   Greater than 50% of this visit was spent in general counseling regarding obesity, diet, exercise, importance of adherence to insulin regime, recognition and treatment of hypo and hyperglycemia,  glucose logging, proper diabetes management, diabetic complications with poor management and the importance of glycemic control in order to avoid the complications of diabetes. Risks and potential complications of diabetes were reviewed with the patient. Diabetes health maintenance plan and follow-up were discussed and understood by the patient. We reviewed the importance of medication compliance and regular follow-up. Aggressive lifestyle modification was encouraged. Exercise Counselling: This patient is a candidate for regular physical exercise. Instructions to perform the following types of exercise:  Swimming or water aerobic exercise  Brisk walking  Playing tennis  Stationary bicycle or elliptical indoor  Low impact aerobic exercise    Instructions given to exercise for the following duration:  30 minutes a day for five-seven days per week.     Following instructions for being active throughout the day in addition to formal exercise:  Walk instead of drive whenever possible  Take the stairs instead of the elevator  Work in the garden  Park to the far end of the parking lot to add more walking steps to destination      Electronically signed by [place] MD on 11/17/2021 at 10:18 AM

## 2022-02-13 DIAGNOSIS — E11.42 TYPE 2 DIABETES MELLITUS WITH PERIPHERAL NEUROPATHY (HCC): ICD-10-CM

## 2022-02-13 DIAGNOSIS — E11.65 UNCONTROLLED TYPE 2 DIABETES MELLITUS WITH HYPERGLYCEMIA (HCC): ICD-10-CM

## 2022-02-13 DIAGNOSIS — E83.52 HYPERCALCEMIA: ICD-10-CM

## 2022-02-13 DIAGNOSIS — I10 ESSENTIAL HYPERTENSION: ICD-10-CM

## 2022-02-14 RX ORDER — GLIPIZIDE 5 MG/1
TABLET ORAL
Qty: 60 TABLET | Refills: 2 | Status: SHIPPED | OUTPATIENT
Start: 2022-02-14 | End: 2022-03-17 | Stop reason: SDUPTHER

## 2022-02-14 NOTE — TELEPHONE ENCOUNTER
Requested Prescriptions     Pending Prescriptions Disp Refills    glipiZIDE (GLUCOTROL) 5 MG tablet [Pharmacy Med Name: glipiZIDE 5 MG TABLET] 60 tablet 2     Sig: TAKE ONE TABLET BY MOUTH TWICE A DAY BEFORE MEALS       Last refilled:11/15/2021  Last seen: in office 11/17/2021  Follow up: in office 3/17/2022

## 2022-02-14 NOTE — TELEPHONE ENCOUNTER
Pt also lvm stating she needs refill for Glipizide 5mg  for 90 days sent to [facility] in [facility]# [phone]

## 2022-03-17 ENCOUNTER — OFFICE VISIT (OUTPATIENT)
Dept: ENDOCRINOLOGY | Age: 72
End: 2022-03-17
Payer: MEDICARE

## 2022-03-17 VITALS
DIASTOLIC BLOOD PRESSURE: 84 MMHG | OXYGEN SATURATION: 98 % | HEART RATE: 72 BPM | SYSTOLIC BLOOD PRESSURE: 170 MMHG | HEIGHT: 62 IN | BODY MASS INDEX: 23.41 KG/M2 | WEIGHT: 127.2 LBS

## 2022-03-17 DIAGNOSIS — E11.65 UNCONTROLLED TYPE 2 DIABETES MELLITUS WITH HYPERGLYCEMIA (HCC): ICD-10-CM

## 2022-03-17 DIAGNOSIS — I10 ESSENTIAL HYPERTENSION: ICD-10-CM

## 2022-03-17 DIAGNOSIS — Z78.9 STATIN INTOLERANCE: ICD-10-CM

## 2022-03-17 DIAGNOSIS — E78.5 DYSLIPIDEMIA ASSOCIATED WITH TYPE 2 DIABETES MELLITUS (HCC): ICD-10-CM

## 2022-03-17 DIAGNOSIS — E11.69 DYSLIPIDEMIA ASSOCIATED WITH TYPE 2 DIABETES MELLITUS (HCC): ICD-10-CM

## 2022-03-17 DIAGNOSIS — E11.42 TYPE 2 DIABETES MELLITUS WITH PERIPHERAL NEUROPATHY (HCC): Primary | ICD-10-CM

## 2022-03-17 DIAGNOSIS — E83.52 HYPERCALCEMIA: ICD-10-CM

## 2022-03-17 PROCEDURE — 99214 OFFICE O/P EST MOD 30 MIN: CPT | Performed by: INTERNAL MEDICINE

## 2022-03-17 RX ORDER — GLIPIZIDE 5 MG/1
TABLET ORAL
Qty: 60 TABLET | Refills: 7 | Status: SHIPPED | OUTPATIENT
Start: 2022-03-17 | End: 2022-06-13

## 2022-03-17 RX ORDER — LANCETS 33 GAUGE
EACH MISCELLANEOUS
Qty: 100 EACH | Refills: 11 | Status: SHIPPED | OUTPATIENT
Start: 2022-03-17

## 2022-03-17 RX ORDER — BLOOD SUGAR DIAGNOSTIC
2 STRIP MISCELLANEOUS DAILY
COMMUNITY
End: 2022-03-17 | Stop reason: SDUPTHER

## 2022-03-17 RX ORDER — METFORMIN HYDROCHLORIDE 500 MG/1
TABLET, EXTENDED RELEASE ORAL
Qty: 120 TABLET | Refills: 11 | Status: SHIPPED | OUTPATIENT
Start: 2022-03-17

## 2022-03-17 RX ORDER — BRIMONIDINE TARTRATE 0.25 MG/ML
1 SOLUTION/ DROPS OPHTHALMIC 2 TIMES DAILY
COMMUNITY

## 2022-03-17 RX ORDER — BLOOD SUGAR DIAGNOSTIC
2 STRIP MISCELLANEOUS DAILY
Qty: 100 EACH | Refills: 11 | Status: SHIPPED | OUTPATIENT
Start: 2022-03-17

## 2022-03-17 NOTE — PROGRESS NOTES
Patient ID:   Winnie Queen is a 66 y.o. female    Chief Complaint:   Winnie Queen presents for an evaluation of Type 2 Diabetes Mellitus , Hyperlipidemia and hypertension. Subjective:   Type 2 Diabetes Mellitus diagnosed in . Never been on insulin   Invokana caused confusion   Actos confused confusion   Januvia caused confusion     Not happy with diabetes care with pcp   Recently fired her PCP     Does not want to take insulin     Metformin ER 500mg, two tabs with breakfast and dinner    Glipizide 5 mg with breakfast and dinner    She is having muscle , leg pains  No cramps   She thinks it is nerve pain     She is using boost instead of snacks, breads, mac n cheese     Checks blood sugars 0.9 times per day. Reviewed meter: [address]    AM:  149-203  Lunch:  Supper:   HS:     Hypoglycemias: None     Meals: three (dinner is bigger), crackers at bedtime. Snacks 2 per day (crackers, sweets (sugar free cookies), pizza rolls). Sometimes glucerna drinks for snacks. Drinks juices sometimes. Sometimes she has diet sodas. Exercise: She says she is always active. Denies chest pain, exertional dyspnea. Family history of CAD: Brother  of MI at ag 64. Both parents had stroke. Denies smoking/ alcohol.    Currently on ASA 81 mg daily     The following portions of the patient's history were reviewed and updated as appropriate:       Family History   Problem Relation Age of Onset    High Blood Pressure Mother     Stroke Mother     Stroke Father          Social History     Socioeconomic History    Marital status: Unknown     Spouse name: Not on file    Number of children: Not on file    Years of education: Not on file    Highest education level: Not on file   Occupational History    Not on file   Tobacco Use    Smoking status: Never Smoker    Smokeless tobacco: Never Used   Vaping Use    Vaping Use: Never used   Substance and Sexual Activity    Alcohol use: Not Currently    Drug use: Never    Sexual activity: Not on file   Other Topics Concern    Not on file   Social History Narrative    Not on file     Social Determinants of Health     Financial Resource Strain:     Difficulty of Paying Living Expenses: Not on file   Food Insecurity:     Worried About Running Out of Food in the Last Year: Not on file    Kassandra of Food in the Last Year: Not on file   Transportation Needs:     Lack of Transportation (Medical): Not on file    Lack of Transportation (Non-Medical):  Not on file   Physical Activity:     Days of Exercise per Week: Not on file    Minutes of Exercise per Session: Not on file   Stress:     Feeling of Stress : Not on file   Social Connections:     Frequency of Communication with Friends and Family: Not on file    Frequency of Social Gatherings with Friends and Family: Not on file    Attends Restorationist Services: Not on file    Active Member of [address] [place] [address] [email or web address] or Organizations: Not on file    Attends Club or Organization Meetings: Not on file    Marital Status: Not on file   Intimate Partner Violence:     Fear of Current or Ex-Partner: Not on file    Emotionally Abused: Not on file    Physically Abused: Not on file    Sexually Abused: Not on file   Housing Stability:     Unable to Pay for Housing in the Last Year: Not on file    Number of [place] in the Last Year: Not on file    Unstable Housing in the Last Year: Not on file       Past Medical History:   Diagnosis Date    Anemia     Anxiety     Biliary colic     Diabetes mellitus with no complication ([address]     Diabetic peripheral neuropathy ([address]     DVT prophylaxis     Gait abnormality     Glaucoma     History of calculus of gallbladder     Hypercalcemia     Hyperlipidemia     Hypertension     Hypertriglyceridemia     Insomnia     Osteoarthritis     Pelvic prolapse     PVD (peripheral vascular disease) ([address]     Sensation of cold in lower extremity     Stress bladder incontinence, female     Urinary incontinence     Urinary incontinence     Vitamin D deficiency        Past Surgical History:   Procedure Laterality Date    BLADDER SURGERY      CHOLECYSTECTOMY      DENTAL SURGERY      Root Canal    PARTIAL HYSTERECTOMY      VAGINA SURGERY           Allergies   Allergen Reactions    Latex Rash     red spots, no hives    Statins Hives, Shortness Of Breath, Swelling and Anaphylaxis    Trazodone Hives    Canagliflozin      Other reaction(s): Other (See Comments)  Confusion    Escitalopram Hives    Ezetimibe Hives    Gabapentin      Other reaction(s): Other (See Comments)  confusion    Lamotrigine Hives    Naltrexone Hives    Other      Cigarette smoke    Perphenazine      Other reaction(s): Other (See Comments)  unsure    Pioglitazone      Other reaction(s): Other (See Comments)  confusion    Pitavastatin Hives    Prednisone Hives    Sitagliptin      Other reaction(s): Other (See Comments)  confusion    Sulfamethoxazole-Trimethoprim Itching         Current Outpatient Medications:     blood glucose test strips (ONETOUCH VERIO) strip, 2 each by In Vitro route daily As needed. , Disp: , Rfl:     Brimonidine Tartrate (LUMIFY) 0.025 % SOLN, Place 1 drop into the left eye in the morning and at bedtime, Disp: , Rfl:     glipiZIDE (GLUCOTROL) 5 MG tablet, TAKE ONE TABLET BY MOUTH TWICE A DAY BEFORE MEALS, Disp: 60 tablet, Rfl: 2    Cholecalciferol (VITAMIN D3) 50 MCG (2000 UT) CAPS, Take by mouth Once or twice weekly, Disp: , Rfl:     cloNIDine (CATAPRES) 0.1 MG tablet, Take 0.1 mg by mouth as needed for High Blood Pressure, Disp: , Rfl:     metFORMIN (GLUCOPHAGE-XR) 500 MG extended release tablet, TAKE TWO TABLETS BY MOUTH TWICE A DAY WITH MEALS, Disp: 120 tablet, Rfl: 5    Multiple Vitamin (MULTIVITAMINS PO), Take by mouth daily, Disp: , Rfl:     Lancets (ONETOUCH DELICA PLUS [id number]) MISC, USE TO TEST TWO TO THREE TIMES A DAY, Disp: , Rfl:     aspirin 81 MG EC tablet, Take 81 mg by mouth daily, Disp: , Rfl:     clonazePAM (KLONOPIN) 0.5 MG tablet, Take 0.5 mg by mouth as needed. , Disp: , Rfl:     fluticasone (FLONASE) 50 MCG/ACT nasal spray, 1 spray by Nasal route daily, Disp: , Rfl:     ibuprofen (ADVIL;MOTRIN) 600 MG tablet, Take 600 mg by mouth every 6 hours as needed, Disp: , Rfl:     latanoprost (XALATAN) 0.005 % ophthalmic solution, Apply 1 drop to eye nightly, Disp: , Rfl:     lisinopril-hydroCHLOROthiazide (PRINZIDE;ZESTORETIC) 20-12.5 MG per tablet, Take 1 tablet by mouth daily, Disp: , Rfl:     meclizine (ANTIVERT) 25 MG tablet, Take 25 mg by mouth as needed, Disp: , Rfl:       Review of Systems:    Constitutional: Negative for fever, chills, and unexpected weight change. HENT: Negative for congestion, ear pain, rhinorrhea,  sore throat and trouble swallowing. Eyes: Negative for photophobia, redness, itching. Respiratory: Negative for cough, shortness of breath and sputum. Cardiovascular: Negative for chest pain, palpitations and leg swelling. Gastrointestinal: Negative for nausea, vomiting, abdominal pain, diarrhea, constipation. Endocrine: Negative for cold intolerance, heat intolerance, polydipsia, polyphagia and polyuria. Genitourinary: Negative for dysuria, urgency, frequency, hematuria and flank pain. Musculoskeletal: Negative for myalgias, back pain, arthralgias and neck pain. Skin/Nail: Negative for rash, itching. Normal nails. Neurological: Negative for seizures, weakness, light-headedness, numbness and headaches. Hematological/ Lymph nodes: Negative for adenopathy. Does not bruise/bleed easily. Psychiatric/Behavioral: Has depression. Negative for suicidal ideas, anxiety, sleep disturbance and decreased concentration.           Objective:   Physical Exam:  BP (!) 187/87 (Site: Right Upper Arm, Position: Sitting, Cuff Size: Large Adult)   Pulse 72   Ht 5' 2\" (1.575 m)   Wt 127 lb 3.2 oz (57.7 kg)   SpO2 98%   BMI 23.27 kg/m²       Lab Review:    Office Visit on 04/12/2021   Component Date Value Ref Range Status    Diabetic Retinopathy 09/01/2020 Negative   Final   Abstract on 04/08/2021   Component Date Value Ref Range Status    Hemoglobin A1C 03/17/2021 7.7  % Final           Assessment and Plan     Marilee Davison was seen today for diabetes. Diagnoses and all orders for this visit:    Type 2 diabetes mellitus with peripheral neuropathy ([facility]  -     Hemoglobin A1C; Future  -      DIABETES FOOT EXAM    Essential hypertension    Hypercalcemia    Dyslipidemia associated with type 2 diabetes mellitus (HCC)    Statin intolerance          1: Type 2 DM complicated with hyperglycemia   Controlled A1C 7.6% < 7.5%< 7.5% < 7.7%        Blood sugars look in a decent range   Some numbers are high in am    C/w Metformin ER 500mg, two tabs with breakfast and dinner    Glipizide 5 mg with breakfast and dinner    All instructions provided in written. Check Blood sugars 2 times per day. Log them along with insulin and send them every 2 weeks. Call for blood sugars less than 60 or more than 400. Eye exam: Everything 3 months denies DR. Marcin Valdivia Glaucoma present. Foot exam:  March 2022 . Neuropathic pain at night. Deformity/amputation: absent  Skin lesions/pre-ulcerative calluses: absent  Edema: right- negative, left- negative  Sensory exam: Monofilament sensation: normal  Pulses: normal, Vibration (128 Hz): severely reduced , right worse than left     April 2021: Normal B12 and folate      Renal screen: Oct 2021    TSH screen:March 2021     2: HTN   High today   At home it runs 130's / 70's     Start amlodipine 10 mg daily     3: Hyperlipidemia   LDL: 132 < 161 , HDL: 48, TGs: 342  < 187 March 2021    Allergic to statins (crestor, pitavastatin pravachol) and zetia   Does not want to take statins     4: Hypercalcemia   Noted in the labs   Never had kidney stones   10.1 -11.4 since Jan 2020   Ca: 10.8, PTH 23 April 2021   Vit D 71 - April 2021 .     Ca 10.3, PTH 36.6, Vit D 42 - Oct 2021   Ca 9.7 - March 2022   She is on HCTZ 12.5mg   Takes Vit D 5,000 units three days a week      RTC in 4 months with logs , A1C     She does labs at [address] [facility] or [facility]   Greater than 50% of this visit was spent in general counseling regarding obesity, diet, exercise, importance of adherence to insulin regime, recognition and treatment of hypo and hyperglycemia,  glucose logging, proper diabetes management, diabetic complications with poor management and the importance of glycemic control in order to avoid the complications of diabetes. Risks and potential complications of diabetes were reviewed with the patient. Diabetes health maintenance plan and follow-up were discussed and understood by the patient. We reviewed the importance of medication compliance and regular follow-up. Aggressive lifestyle modification was encouraged. Exercise Counselling: This patient is a candidate for regular physical exercise. Instructions to perform the following types of exercise:  Swimming or water aerobic exercise  Brisk walking  Playing tennis  Stationary bicycle or elliptical indoor  Low impact aerobic exercise    Instructions given to exercise for the following duration:  30 minutes a day for five-seven days per week.     Following instructions for being active throughout the day in addition to formal exercise:  Walk instead of drive whenever possible  Take the stairs instead of the elevator  Work in the garden  Park to the far end of the parking lot to add more walking steps to destination      Electronically signed by Anthony Villa MD on 3/17/2022 at 11:34 AM

## 2022-04-18 DIAGNOSIS — E83.52 HYPERCALCEMIA: ICD-10-CM

## 2022-04-18 DIAGNOSIS — I10 ESSENTIAL HYPERTENSION: ICD-10-CM

## 2022-04-18 DIAGNOSIS — E11.42 TYPE 2 DIABETES MELLITUS WITH PERIPHERAL NEUROPATHY (HCC): ICD-10-CM

## 2022-04-18 DIAGNOSIS — E11.65 UNCONTROLLED TYPE 2 DIABETES MELLITUS WITH HYPERGLYCEMIA (HCC): ICD-10-CM

## 2022-04-18 RX ORDER — METFORMIN HYDROCHLORIDE 500 MG/1
TABLET, EXTENDED RELEASE ORAL
Qty: 120 TABLET | Refills: 11 | OUTPATIENT
Start: 2022-04-18

## 2022-06-13 ENCOUNTER — TELEPHONE (OUTPATIENT)
Dept: ENDOCRINOLOGY | Age: 72
End: 2022-06-13

## 2022-06-13 DIAGNOSIS — E11.42 TYPE 2 DIABETES MELLITUS WITH PERIPHERAL NEUROPATHY (HCC): ICD-10-CM

## 2022-06-13 DIAGNOSIS — E83.52 HYPERCALCEMIA: ICD-10-CM

## 2022-06-13 DIAGNOSIS — E11.65 UNCONTROLLED TYPE 2 DIABETES MELLITUS WITH HYPERGLYCEMIA (HCC): ICD-10-CM

## 2022-06-13 DIAGNOSIS — I10 ESSENTIAL HYPERTENSION: ICD-10-CM

## 2022-06-13 RX ORDER — METFORMIN HYDROCHLORIDE 500 MG/5ML
FOR SUSPENSION, EXTENDED RELEASE ORAL
Qty: 600 ML | Refills: 0 | Status: SHIPPED | OUTPATIENT
Start: 2022-06-13 | End: 2022-06-17

## 2022-06-13 RX ORDER — GLIPIZIDE 10 MG/1
TABLET ORAL
Qty: 60 TABLET | Refills: 2 | Status: SHIPPED | OUTPATIENT
Start: 2022-06-13

## 2022-06-13 NOTE — TELEPHONE ENCOUNTER
Please advise. sterling did prescribe XR. BG this morning was 205 at 0826. Pt crushed 2 500 XR this am, and took them this morning. C/o difficulty swallowing metformin pills.     Pt states they confused there Metformin w/ MV, and states they ate a banana, a glucose tab, and sugary cereal     At 0910 it was 225       BG last night, 180

## 2022-06-13 NOTE — TELEPHONE ENCOUNTER
Metformin Er should not be crushed.    I did send the liquid form , Riomet as per request.   May have to do a PA for it

## 2022-06-13 NOTE — TELEPHONE ENCOUNTER
Pt states she's afraid her sugar is going to drop so she's trying to find some sugary things in the house to eat because she had to crush her 2 Metformin pills this morming (she cant swallow pills) and she read where your not suppose to crush them because they are extended release.  Pt states she would like Dr to prescribe the Metformin in a liquid form    #[phone]

## 2022-06-13 NOTE — TELEPHONE ENCOUNTER
FYI:    Pt states BG at 301. pts daughter is now w/ her at home to help monitor her closely. Pt will start on liquid Metformin when they get it. Pt will let us know if it needs a PA.

## 2022-06-14 ENCOUNTER — TELEPHONE (OUTPATIENT)
Dept: ENDOCRINOLOGY | Age: 72
End: 2022-06-14

## 2022-06-14 NOTE — TELEPHONE ENCOUNTER
Pt needs PA done for Riomet ER (Metformin HCI ER)      Aka liquid metformin. Please initiate    Pt c/o dysphagia w/ ER Tabs.

## 2022-06-14 NOTE — TELEPHONE ENCOUNTER
Change glipizide to 10 mg twice a day with meals   Check sugars once or twice a day and make an early appointment if sugars remain high

## 2022-06-14 NOTE — TELEPHONE ENCOUNTER
Scan in media. Pt does not need PA for Riomet    Called and informed pt, pt expressed understanding.

## 2022-06-14 NOTE — TELEPHONE ENCOUNTER
Called and informed pt, pt expressed understanding. Pt rescheduled appt to this Friday to allow transportation to be made. Pt aware to bring BG log. Pt informed us that pharmacy needs PA for Liquid Metformin.  Will route separate message to PA team.

## 2022-06-14 NOTE — TELEPHONE ENCOUNTER
PA submitted today 6/14/2022.     Kamran Roldan ([place]: [employer])  [address] [facility]

## 2022-06-17 ENCOUNTER — OFFICE VISIT (OUTPATIENT)
Dept: ENDOCRINOLOGY | Age: 72
End: 2022-06-17
Payer: MEDICARE

## 2022-06-17 VITALS
HEART RATE: 76 BPM | DIASTOLIC BLOOD PRESSURE: 79 MMHG | WEIGHT: 121 LBS | SYSTOLIC BLOOD PRESSURE: 134 MMHG | BODY MASS INDEX: 22.13 KG/M2

## 2022-06-17 DIAGNOSIS — E11.69 DYSLIPIDEMIA ASSOCIATED WITH TYPE 2 DIABETES MELLITUS (HCC): ICD-10-CM

## 2022-06-17 DIAGNOSIS — E11.42 TYPE 2 DIABETES MELLITUS WITH PERIPHERAL NEUROPATHY (HCC): ICD-10-CM

## 2022-06-17 DIAGNOSIS — Z78.9 STATIN INTOLERANCE: ICD-10-CM

## 2022-06-17 DIAGNOSIS — E11.42 TYPE 2 DIABETES MELLITUS WITH PERIPHERAL NEUROPATHY (HCC): Primary | ICD-10-CM

## 2022-06-17 DIAGNOSIS — E78.5 DYSLIPIDEMIA ASSOCIATED WITH TYPE 2 DIABETES MELLITUS (HCC): ICD-10-CM

## 2022-06-17 DIAGNOSIS — E11.65 UNCONTROLLED TYPE 2 DIABETES MELLITUS WITH HYPERGLYCEMIA (HCC): ICD-10-CM

## 2022-06-17 PROCEDURE — 99214 OFFICE O/P EST MOD 30 MIN: CPT | Performed by: INTERNAL MEDICINE

## 2022-06-17 PROCEDURE — 1123F ACP DISCUSS/DSCN MKR DOCD: CPT | Performed by: INTERNAL MEDICINE

## 2022-06-17 NOTE — PROGRESS NOTES
Patient ID:   Sondra Grijalva is a 66 y.o. female    Chief Complaint:   Sondra Grijalva presents for an evaluation of Type 2 Diabetes Mellitus , Hyperlipidemia and hypertension. Subjective:   Type 2 Diabetes Mellitus diagnosed in . Never been on insulin   Invokana caused confusion   Actos confused confusion   Nareshuvia caused confusion     Not happy with diabetes care with pcp   Recently fired her PCP     Does not want to take insulin     Metformin ER 500mg, two tabs with breakfast and dinner. It is going through. Riomet on backorders    She stated taking OTC supplement fiber and diarrhea is gone. Glipizide 10 mg with breakfast and dinner    She is having muscle, leg weakness. No pains  No cramps   She thinks it is nerve pain     She is using boost instead of snacks, breads, mac n cheese     Checks blood sugars 0.9 times per day. Reviewed meter: . She thinks she is not following the diet  . 7 day [address], 90 day average 183. AM:    Lunch:  Supper:   HS:     Hypoglycemias: None     Meals: three (dinner is bigger), crackers at bedtime. Snacks 2 per day (crackers, sweets (sugar free cookies), pizza rolls). Sometimes glucerna drinks for snacks. Drinks juices sometimes. Sometimes she has diet sodas. Exercise: She says she is always active. Denies chest pain, exertional dyspnea. Family history of CAD: Brother  of MI at ag 64. Both parents had stroke. Denies smoking/ alcohol.    Currently on ASA 81 mg daily     The following portions of the patient's history were reviewed and updated as appropriate:       Family History   Problem Relation Age of Onset    High Blood Pressure Mother     Stroke Mother     Stroke Father          Social History     Socioeconomic History    Marital status: Unknown     Spouse name: Not on file    Number of children: Not on file    Years of education: Not on file    Highest education level: Not on file   Occupational History    Not on file   Tobacco Insomnia     Osteoarthritis     Pelvic prolapse     [facility]     Sensation of cold in lower extremity     Stress bladder incontinence, female     Urinary incontinence     Urinary incontinence     Vitamin D deficiency        Past Surgical History:   Procedure Laterality Date    BLADDER SURGERY      CHOLECYSTECTOMY      DENTAL SURGERY      Root Canal    PARTIAL HYSTERECTOMY (CERVIX NOT REMOVED)      VAGINA SURGERY           Allergies   Allergen Reactions    Latex Rash     red spots, no hives    Statins Hives, Shortness Of Breath, Swelling and Anaphylaxis    Trazodone Hives    Canagliflozin      Other reaction(s): Other (See Comments)  Confusion    Escitalopram Hives    Ezetimibe Hives    Gabapentin      Other reaction(s): Other (See Comments)  confusion    Lamotrigine Hives    Naltrexone Hives    Other      Cigarette smoke    Perphenazine      Other reaction(s): Other (See Comments)  unsure    Pioglitazone      Other reaction(s): Other (See Comments)  confusion    Pitavastatin Hives    Prednisone Hives    Sitagliptin      Other reaction(s): Other (See Comments)  confusion    Sulfamethoxazole-Trimethoprim Itching         Current Outpatient Medications:     glipiZIDE (GLUCOTROL) 10 MG tablet, TAKE ONE TABLET BY MOUTH TWICE A DAY BEFORE MEALS, Disp: 60 tablet, Rfl: 2    Brimonidine Tartrate (LUMIFY) 0.025 % SOLN, Place 1 drop into the left eye in the morning and at bedtime, Disp: , Rfl:     metFORMIN (GLUCOPHAGE-XR) 500 MG extended release tablet, TAKE TWO TABLETS BY MOUTH TWICE A DAY WITH MEALS, Disp: 120 tablet, Rfl: 11    ONETOUCH VERIO strip, 2 each by In Vitro route daily As needed. , Disp: 100 each, Rfl: 11    Lancets (ONETOUCH DELICA PLUS [id number]) MISC, USE TO TEST TWO TO THREE TIMES A DAY, Disp: 100 each, Rfl: 11    Cholecalciferol (VITAMIN D3) 50 MCG (2000 UT) CAPS, Take by mouth Once or twice weekly, Disp: , Rfl:     Multiple Vitamin (MULTIVITAMINS PO), Take by mouth daily, Disp: , Rfl:     aspirin 81 MG EC tablet, Take 81 mg by mouth daily, Disp: , Rfl:     clonazePAM (KLONOPIN) 0.5 MG tablet, Take 0.5 mg by mouth as needed. , Disp: , Rfl:     fluticasone (FLONASE) 50 MCG/ACT nasal spray, 1 spray by Nasal route daily, Disp: , Rfl:     latanoprost (XALATAN) 0.005 % ophthalmic solution, Apply 1 drop to eye nightly, Disp: , Rfl:     lisinopril-hydroCHLOROthiazide (PRINZIDE;ZESTORETIC) 20-12.5 MG per tablet, Take 1 tablet by mouth daily, Disp: , Rfl:     meclizine (ANTIVERT) 25 MG tablet, Take 25 mg by mouth as needed, Disp: , Rfl:     cloNIDine (CATAPRES) 0.1 MG tablet, Take 0.1 mg by mouth as needed for High Blood Pressure (Patient not taking: Reported on 6/17/2022), Disp: , Rfl:     ibuprofen (ADVIL;MOTRIN) 600 MG tablet, Take 600 mg by mouth every 6 hours as needed (Patient not taking: Reported on 6/17/2022), Disp: , Rfl:       Review of Systems:    Constitutional: Negative for fever, chills, and unexpected weight change. HENT: Negative for congestion, ear pain, rhinorrhea,  sore throat and trouble swallowing. Eyes: Negative for photophobia, redness, itching. Respiratory: Negative for cough, shortness of breath and sputum. Cardiovascular: Negative for chest pain, palpitations and leg swelling. Gastrointestinal: Negative for nausea, vomiting, abdominal pain, diarrhea, constipation. Endocrine: Negative for cold intolerance, heat intolerance, polydipsia, polyphagia and polyuria. Genitourinary: Negative for dysuria, urgency, frequency, hematuria and flank pain. Musculoskeletal: Negative for myalgias, back pain, arthralgias and neck pain. Skin/Nail: Negative for rash, itching. Normal nails. Neurological: Negative for seizures, weakness, light-headedness, numbness and headaches. Hematological/ Lymph nodes: Negative for adenopathy. Does not bruise/bleed easily. Psychiatric/Behavioral: Has depression.    Negative for suicidal ideas, anxiety, sleep disturbance and decreased concentration. Objective:   Physical Exam:  /79   Pulse 76   Wt 121 lb (54.9 kg)   BMI 22.13 kg/m²       Lab Review:    No visits with results within 1 Year(s) from this visit. Latest known visit with results is:   Office Visit on 04/12/2021   Component Date Value Ref Range Status    Diabetic Retinopathy 09/01/2020 Negative   Final           Assessment and Era Councilman was seen today for diabetes. Diagnoses and all orders for this visit:    Type 2 diabetes mellitus with peripheral neuropathy ([facility] [address]  -     Hemoglobin A1C; [facility] Individual Diabetes Education (Non Care Coord Patient), [facility]    Uncontrolled type 2 diabetes mellitus with hyperglycemia ([facility] [address]  -     Hemoglobin A1C; [facility] Individual Diabetes Education (Non Care Coord Patient), [facility]    Dyslipidemia associated with type 2 diabetes mellitus ([facility] [address]  -     Hemoglobin A1C; [facility] Individual Diabetes Education (Non Care Coord Patient), [facility]    Statin intolerance  -     Hemoglobin A1C; [facility] Individual Diabetes Education (Non Care Coord Patient), [facility]          1: Type 2 DM complicated with hyperglycemia   Controlled A1C 7.6% < 7.5%< 7.5% < 7.7%        Blood sugars are variables   She needs to work on diet, keep carbs under 30 with each meals   Refer to CDE    C/w Metformin ER 500mg, two tabs with breakfast and dinner  . She is tolerating well with fiber supplement. If unable to tolerate then stop metformin and start low dose insulin,   Glipizide 10 mg with breakfast and dinner    All instructions provided in written. Check Blood sugars 2 times per day. Log them along with insulin and send them every 2 weeks. Call for blood sugars less than 60 or more than 400. Eye exam: Everything 3 months denies DR. Marko Melchor Glaucoma present. Foot exam:  March 2022 . Neuropathic pain at night. Deformity/amputation: absent  Skin lesions/pre-ulcerative calluses: absent  Edema: right- negative, left- negative  Sensory exam: Monofilament sensation: normal  Pulses: normal, Vibration (128 Hz): severely reduced , right worse than left     April 2021: Normal B12 and folate      Renal screen: Oct 2021    TSH screen:March 2021     2: HTN   Controlled     3: Hyperlipidemia   LDL: 132 < 161 , HDL: 48, TGs: 342  < 187 March 2021    Allergic to statins (crestor, pitavastatin pravachol) and zetia   Does not want to take statins     4: Hypercalcemia   Noted in the labs   Never had kidney stones   10.1 -11.4 since Jan 2020   Ca: 10.8, PTH 23 April 2021   Vit D 69 - April 2021 . Ca 10.3, PTH 36.6, Vit D 42 - Oct 2021   Ca 9.7 - March 2022   She is on HCTZ 12.5mg   Takes Vit D 5,000 units three days a week      A1c today     RTC in 3 months with logs     She does labs at [address] [place] [address] or [facility]   Greater than 50% of this visit was spent in general counseling regarding obesity, diet, exercise, importance of adherence to insulin regime, recognition and treatment of hypo and hyperglycemia,  glucose logging, proper diabetes management, diabetic complications with poor management and the importance of glycemic control in order to avoid the complications of diabetes. Risks and potential complications of diabetes were reviewed with the patient. Diabetes health maintenance plan and follow-up were discussed and understood by the patient. We reviewed the importance of medication compliance and regular follow-up. Aggressive lifestyle modification was encouraged. Exercise Counselling: This patient is a candidate for regular physical exercise.  Instructions to perform the following types of exercise:  Swimming or water aerobic exercise  Brisk walking  Playing tennis  Stationary bicycle or elliptical indoor  Low impact aerobic exercise    Instructions given to exercise for the following duration:  30 minutes a day for five-seven days per week.     Following instructions for being active throughout the day in addition to formal exercise:  Walk instead of drive whenever possible  Take the stairs instead of the elevator  Work in the garden  Park to the far end of the parking lot to add more walking steps to destination      Electronically signed by Arun Aragon MD on 6/17/2022 at 10:36 AM

## 2022-06-18 LAB
ESTIMATED AVERAGE GLUCOSE: 174.3 MG/DL
HBA1C MFR BLD: 7.7 %

## 2022-06-21 ENCOUNTER — TELEPHONE (OUTPATIENT)
Dept: ENDOCRINOLOGY | Age: 72
End: 2022-06-21

## 2022-06-22 ENCOUNTER — OFFICE VISIT (OUTPATIENT)
Dept: ENDOCRINOLOGY | Age: 72
End: 2022-06-22
Payer: MEDICARE

## 2022-06-22 DIAGNOSIS — E11.42 TYPE 2 DIABETES MELLITUS WITH PERIPHERAL NEUROPATHY (HCC): ICD-10-CM

## 2022-06-22 PROCEDURE — 97802 MEDICAL NUTRITION INDIV IN: CPT | Performed by: DIETITIAN, REGISTERED

## 2022-06-22 NOTE — PROGRESS NOTES
Medical Nutrition Therapy for Diabetes    Dayan Liu  June 22, 2022      Patient Care Team:  Ubaldo Hardy as PCP - General (Family Medicine)    Reason for visit: Type 2 Diabetes    ASSESSMENT/PLAN:   NUTRITION DIAGNOSIS  Initial Visit    #1 Problem: Altered Nutrition-Related Laboratory Values (NC-2.2)  Related to: Endocrine/Diabetes   As Evidenced by: Elevated Plasma glucose and/or HgbA1c levels         #2 Problem: Excessive Carbohydrate Intake (NI-5.8. 2)  Related to: Food-and nutrition-related knowledge deficit concerning appropriate amount of carbohydrate intake  As evidenced by: Estimated carbohydrate intake that is consistently more than the recommended amounts    #3 Problem: Knowledge and Beliefs-NB-3.1                       Food and nutrition deficits    NUTRITION INTERVENTION  Nutrition Prescription: 30 -45grams carbohydrate per meal with protein and non-starch vegetables  15 gram carbohydrate snacks    Diabetes Education/Counseling included: Pathophysiology of Diabetes  Carbohydrate Control, Activity/exercise, Label-reading, Monitoring and Medications, Benefits of adequate hydration, quality sleep and stress management    Interventions:  Control Carbohydrate Intake using Plate Guide, Control Carbohydrate Intake using Carb Counting, Increase intake of vegetables and Increase intake of whole grains  Recommended consistent 3 meals with 4-5 hour intervals and reduced carbohydrate to 30-45 grams per meal.  Encouraged increasing water and decreasing coffee. Suggested taking clonopin as prescribed to improve sleep quality. Encouraged to consider consult with PCP about this issue.   Handouts: Healthy Eating Guidelines for Diabetes-[place], Sample menus-[place], Planning Healthy Meals-[employer]  NUTRITION MONITORING AND EVALUATION  3 meals spaced 4-5 hours apart  30 - 45 gram carbohydrate meals  Consult PCP about sleep deficits       Patient Active Problem List   Diagnosis    Diabetes mellitus with no complication (HCC)    Allergies    Anxiety    Hypercalcemia    Hyperlipidemia    Insomnia    Osteoarthrosis    Hypertension    Sensation of cold in lower extremity    Urinary incontinence    Vitamin D deficiency    Diabetic peripheral neuropathy (HCC)    DVT prophylaxis    Uncontrolled type 2 diabetes mellitus with hyperglycemia (HCC)    Dyslipidemia associated with type 2 diabetes mellitus (HCC)    Essential hypertension    Statin intolerance    Type 2 diabetes mellitus with peripheral neuropathy (HCC)    Diarrhea    Seasonal allergic rhinitis       Current Outpatient Medications   Medication Sig Dispense Refill    glipiZIDE (GLUCOTROL) 10 MG tablet TAKE ONE TABLET BY MOUTH TWICE A DAY BEFORE MEALS 60 tablet 2    Brimonidine Tartrate (LUMIFY) 0.025 % SOLN Place 1 drop into the left eye in the morning and at bedtime      metFORMIN (GLUCOPHAGE-XR) 500 MG extended release tablet TAKE TWO TABLETS BY MOUTH TWICE A DAY WITH MEALS 120 tablet 11    ONETOUCH VERIO strip 2 each by In Vitro route daily As needed. 100 each 11    Lancets (ONETOUCH DELICA PLUS [id number]) MISC USE TO TEST TWO TO THREE TIMES A  each 11    Cholecalciferol (VITAMIN D3) 50 MCG (2000 UT) CAPS Take by mouth Once or twice weekly      cloNIDine (CATAPRES) 0.1 MG tablet Take 0.1 mg by mouth as needed for High Blood Pressure (Patient not taking: Reported on 6/17/2022)      Multiple Vitamin (MULTIVITAMINS PO) Take by mouth daily      aspirin 81 MG EC tablet Take 81 mg by mouth daily      clonazePAM (KLONOPIN) 0.5 MG tablet Take 0.5 mg by mouth as needed.        fluticasone (FLONASE) 50 MCG/ACT nasal spray 1 spray by Nasal route daily      ibuprofen (ADVIL;MOTRIN) 600 MG tablet Take 600 mg by mouth every 6 hours as needed (Patient not taking: Reported on 6/17/2022)      latanoprost (XALATAN) 0.005 % ophthalmic solution Apply 1 drop to eye nightly      lisinopril-hydroCHLOROthiazide (PRINZIDE;ZESTORETIC) 20-12.5 MG per tablet Take 1 tablet by mouth daily      meclizine (ANTIVERT) 25 MG tablet Take 25 mg by mouth as needed       No current facility-administered medications for this visit. NUTRITION ASSESSMENT    Biochemical Data:    Lab Results   Component Value Date    LABA1C 7.7 06/17/2022     Lab Results   Component Value Date    .3 06/17/2022       No results found for: CHOL  No results found for: TRIG  No results found for: HDL  No results found for: LDLCALC, LDLCHOLESTEROL  No results found for: LABVLDL, VLDL  No results found for: CHOLHDLRATIO    No results found for: WBC, HGB, HCT, MCV, PLT    No results found for: CREATININE, BUN, NA, K, CL, CO2    Diabetes Medications: Yes  Knows name and dose of prescribed medications Yes  Knows prescribed schedule for medicationsYes  Recent change in medication type/dosage: Yes  Stores  medications properlyYes  Comments:     Monitoring:   Has BG meter: Yes  Testing frequency: 3/day  Recent results: fasting 160, before supper 200 \"off and on\"  Hypoglycemia? No    Anthropometric Measurements: Wt:   Wt Readings from Last 3 Encounters:   06/17/22 121 lb (54.9 kg)   03/17/22 127 lb 3.2 oz (57.7 kg)   11/17/21 124 lb (56.2 kg)      BMI:   BMI Readings from Last 3 Encounters:   06/17/22 22.13 kg/m²   03/17/22 23.27 kg/m²   11/17/21 22.68 kg/m²     Patient's stated goal weight:   7% Weight loss goal weight:     Physical Activity History:   Physical activity: \"I never set down. \"  Frequency of activity: most days  Duration of activity: most of the day  Obstacles to activity: none    Sleep: poor    Food and Nutrition History:   Nutrition Awareness/Previous DSMES: yes  Number of people in household: 1  Frequency of Meals Eaten away from home:not eating out  Food Availability Problems  Within the past 12 months, have you worried that your food would run out before you got money to buy more? No  Within the past 12 months, has the food you bought not lasted till the end of the month and you didn't have money to get more? No  Beverage consumption: water-flavored-16-24 ounces/day, diet pop - 1-2 /week, coffee - 24-36 cups/day, tea - occasionally  Alcohol consumption: No  Usual Food consumption:   8 am coffee, low sugar oats - 1 pkt/egg, santos-, toast/ waffle, fresh fruit cup  1- 2 pm (may skip this meal) sandwich-1 slice bread/Frozen meals  5-6 pm meat balls w/ BBQ sauce/Grilled cheese sandwich  11 pm snack    Barriers:   -none          Follow Up Plan: 3 months Will remain available. Contact information given.     Referring Provider: Livier Levy MD  Time spent with patient: 75 minutes

## 2022-08-04 ENCOUNTER — TELEPHONE (OUTPATIENT)
Dept: ENDOCRINOLOGY | Age: 72
End: 2022-08-04

## 2022-08-04 NOTE — TELEPHONE ENCOUNTER
MAGI,   Patient stated that she has been admitted to [facility] and is scheduled to have triple bypass surgery tomorrow morning.   She stated that she just wanted to give Dr. Randa Jones an update of her current health status     # [phone]

## 2022-10-04 ENCOUNTER — OFFICE VISIT (OUTPATIENT)
Dept: ENDOCRINOLOGY | Age: 72
End: 2022-10-04
Payer: MEDICARE

## 2022-10-04 VITALS
WEIGHT: 125.2 LBS | DIASTOLIC BLOOD PRESSURE: 79 MMHG | HEIGHT: 62 IN | BODY MASS INDEX: 23.04 KG/M2 | OXYGEN SATURATION: 98 % | HEART RATE: 62 BPM | SYSTOLIC BLOOD PRESSURE: 165 MMHG

## 2022-10-04 DIAGNOSIS — Z78.9 STATIN INTOLERANCE: ICD-10-CM

## 2022-10-04 DIAGNOSIS — E78.5 DYSLIPIDEMIA ASSOCIATED WITH TYPE 2 DIABETES MELLITUS (HCC): ICD-10-CM

## 2022-10-04 DIAGNOSIS — E11.69 DYSLIPIDEMIA ASSOCIATED WITH TYPE 2 DIABETES MELLITUS (HCC): ICD-10-CM

## 2022-10-04 DIAGNOSIS — E55.9 VITAMIN D DEFICIENCY: ICD-10-CM

## 2022-10-04 DIAGNOSIS — E11.42 TYPE 2 DIABETES MELLITUS WITH PERIPHERAL NEUROPATHY (HCC): Primary | ICD-10-CM

## 2022-10-04 DIAGNOSIS — E11.65 UNCONTROLLED TYPE 2 DIABETES MELLITUS WITH HYPERGLYCEMIA (HCC): ICD-10-CM

## 2022-10-04 DIAGNOSIS — E11.42 TYPE 2 DIABETES MELLITUS WITH PERIPHERAL NEUROPATHY (HCC): ICD-10-CM

## 2022-10-04 LAB
A/G RATIO: 1.9 (ref 1.1–2.2)
ALBUMIN SERPL-MCNC: 4.4 G/DL (ref 3.4–5)
ALP BLD-CCNC: 127 U/L (ref 40–129)
ALT SERPL-CCNC: 12 U/L (ref 10–40)
ANION GAP SERPL CALCULATED.3IONS-SCNC: 13 MMOL/L (ref 3–16)
AST SERPL-CCNC: 12 U/L (ref 15–37)
BILIRUB SERPL-MCNC: 0.3 MG/DL (ref 0–1)
BUN BLDV-MCNC: 14 MG/DL (ref 7–20)
CALCIUM SERPL-MCNC: 10 MG/DL (ref 8.3–10.6)
CHLORIDE BLD-SCNC: 98 MMOL/L (ref 99–110)
CO2: 27 MMOL/L (ref 21–32)
CREAT SERPL-MCNC: 0.6 MG/DL (ref 0.6–1.2)
GFR AFRICAN AMERICAN: >60
GFR NON-AFRICAN AMERICAN: >60
GLUCOSE BLD-MCNC: 116 MG/DL (ref 70–99)
POTASSIUM SERPL-SCNC: 4 MMOL/L (ref 3.5–5.1)
SODIUM BLD-SCNC: 138 MMOL/L (ref 136–145)
T4 FREE: 1.2 NG/DL (ref 0.9–1.8)
TOTAL PROTEIN: 6.7 G/DL (ref 6.4–8.2)
TSH SERPL DL<=0.05 MIU/L-ACNC: 1.14 UIU/ML (ref 0.27–4.2)
VITAMIN D 25-HYDROXY: 48.1 NG/ML

## 2022-10-04 PROCEDURE — 99214 OFFICE O/P EST MOD 30 MIN: CPT | Performed by: INTERNAL MEDICINE

## 2022-10-04 PROCEDURE — 1123F ACP DISCUSS/DSCN MKR DOCD: CPT | Performed by: INTERNAL MEDICINE

## 2022-10-04 PROCEDURE — 3051F HG A1C>EQUAL 7.0%<8.0%: CPT | Performed by: INTERNAL MEDICINE

## 2022-10-04 RX ORDER — DULAGLUTIDE 0.75 MG/.5ML
0.75 INJECTION, SOLUTION SUBCUTANEOUS WEEKLY
Qty: 2 ML | Refills: 5 | Status: SHIPPED | OUTPATIENT
Start: 2022-10-04

## 2022-10-04 RX ORDER — ASPIRIN 325 MG
325 TABLET ORAL DAILY
COMMUNITY

## 2022-10-04 RX ORDER — DULAGLUTIDE 0.75 MG/.5ML
INJECTION, SOLUTION SUBCUTANEOUS
Qty: 2 ADJUSTABLE DOSE PRE-FILLED PEN SYRINGE | Refills: 0 | COMMUNITY
Start: 2022-10-04

## 2022-10-04 RX ORDER — LISINOPRIL 20 MG/1
TABLET ORAL
COMMUNITY
Start: 2022-09-29

## 2022-10-04 NOTE — PROGRESS NOTES
Patient ID:   Claudette Mixer is a 66 y.o. female    Chief Complaint:   Claudette Mixer presents for an evaluation of Type 2 Diabetes Mellitus , Hyperlipidemia and hypertension. Subjective:   Type 2 Diabetes Mellitus diagnosed in . Never been on insulin   Invokana caused confusion   Actos confused confusion   Nareshuvia caused confusion     Not happy with diabetes care with pcp   Recently fired her PCP     Does not want to take insulin     Metformin ER 500mg, two tabs with breakfast and dinner. It is going through. Riomet on backorders    She stated taking OTC supplement fiber and diarrhea is gone. Glipizide 10 mg with breakfast and dinner    She does not have help for meals   She was suppose to get meals on wheels   She is also stressed out more to get the help      Checks blood sugars 0.9 times per day. Reviewed meter: . She thinks she is not following the diet  . 7 day ave 202, 90 day average 183. AM: 129-400  Lunch:  Supper:   HS:     Hypoglycemias: None     Meals: three (dinner is bigger), crackers at bedtime. Snacks 2 per day (crackers, sweets (sugar free cookies), pizza rolls). Sometimes glucerna drinks for snacks. Drinks juices sometimes. Sometimes she has diet sodas. Exercise: She says she is always active. Denies chest pain, exertional dyspnea. CAD s/p CABG - Aug 2022   Family history of CAD: Brother  of MI at ag 64. Both parents had stroke. Denies smoking/ alcohol.    Currently on ASA 81 mg daily     The following portions of the patient's history were reviewed and updated as appropriate:         Family History   Problem Relation Age of Onset    High Blood Pressure Mother     Stroke Mother     Stroke Father          Social History     Socioeconomic History    Marital status: Unknown     Spouse name: Not on file    Number of children: Not on file    Years of education: Not on file    Highest education level: Not on file   Occupational History    Not on file   Tobacco Use Smoking status: Never    Smokeless tobacco: Never   Vaping Use    Vaping Use: Never used   Substance and Sexual Activity    Alcohol use: Not Currently    Drug use: Never    Sexual activity: Not on file   Other Topics Concern    Not on file   Social History Narrative    Not on file     Social Determinants of Health     Financial Resource Strain: Not on file   Food Insecurity: Not on file   Transportation Needs: Not on file   Physical Activity: Not on file   Stress: Not on file   Social Connections: Not on file   Intimate Partner Violence: Not on file   Housing Stability: Not on file       Past Medical History:   Diagnosis Date    Anemia     Anxiety     Biliary colic     Diabetes mellitus with no complication ([facility]     [facility]     DVT prophylaxis     Gait abnormality     Glaucoma     History of calculus of gallbladder     Hypercalcemia     Hyperlipidemia     Hypertension     Hypertriglyceridemia     Insomnia     Osteoarthritis     Pelvic prolapse     PVD (peripheral vascular disease) ([facility])     Sensation of cold in lower extremity     Stress bladder incontinence, female     Urinary incontinence     Urinary incontinence     Vitamin D deficiency        Past Surgical History:   Procedure Laterality Date    BLADDER SURGERY      CHOLECYSTECTOMY      DENTAL SURGERY      Root Canal    PARTIAL HYSTERECTOMY (CERVIX NOT REMOVED)      VAGINA SURGERY             Allergies   Allergen Reactions    Latex Rash     red spots, no hives    Statins Hives, Shortness Of Breath, Swelling and Anaphylaxis    Trazodone Hives    Canagliflozin      Other reaction(s): Other (See Comments)  Confusion    Escitalopram Hives    Ezetimibe Hives    Gabapentin      Other reaction(s): Other (See Comments)  confusion    Lamotrigine Hives    Naltrexone Hives    Other      Cigarette smoke    Perphenazine      Other reaction(s): Other (See Comments)  unsure    Pioglitazone      Other reaction(s):  Other (See Comments)  confusion Pitavastatin Hives    Prednisone Hives    Sitagliptin      Other reaction(s): Other (See Comments)  confusion    Sulfamethoxazole-Trimethoprim Itching         Current Outpatient Medications:     aspirin 325 MG tablet, Take 325 mg by mouth daily, Disp: , Rfl:     lisinopril (PRINIVIL;ZESTRIL) 20 MG tablet, , Disp: , Rfl:     metoprolol tartrate (LOPRESSOR) 25 MG tablet, Take 25 mg by mouth 2 times daily, Disp: , Rfl:     Dulaglutide (TRULICITY) 6.81 FK/5.2SP SOPN, Inject 0.75 mg into the skin once a week, Disp: 2 mL, Rfl: 5    Dulaglutide (TRULICITY) 0.85 YE/9.1OE SOPN, Lot # [id number] Ex 12/8/2023, Disp: 2 Adjustable Dose Pre-filled Pen Syringe, Rfl: 0    glipiZIDE (GLUCOTROL) 10 MG tablet, TAKE ONE TABLET BY MOUTH TWICE A DAY BEFORE MEALS, Disp: 60 tablet, Rfl: 2    Brimonidine Tartrate (LUMIFY) 0.025 % SOLN, Place 1 drop into the left eye in the morning and at bedtime, Disp: , Rfl:     metFORMIN (GLUCOPHAGE-XR) 500 MG extended release tablet, TAKE TWO TABLETS BY MOUTH TWICE A DAY WITH MEALS, Disp: 120 tablet, Rfl: 11    ONETOUCH VERIO strip, 2 each by In Vitro route daily As needed. , Disp: 100 each, Rfl: 11    Lancets (ONETOUCH DELICA PLUS [id number]) MISC, USE TO TEST TWO TO THREE TIMES A DAY, Disp: 100 each, Rfl: 11    fluticasone (FLONASE) 50 MCG/ACT nasal spray, 1 spray by Nasal route daily, Disp: , Rfl:     ibuprofen (ADVIL;MOTRIN) 600 MG tablet, Take 600 mg by mouth every 6 hours as needed, Disp: , Rfl:     latanoprost (XALATAN) 0.005 % ophthalmic solution, Apply 1 drop to eye nightly, Disp: , Rfl:     meclizine (ANTIVERT) 25 MG tablet, Take 25 mg by mouth as needed, Disp: , Rfl:     clonazePAM (KLONOPIN) 0.5 MG tablet, Take 0.5 mg by mouth as needed. (Patient not taking: No sig reported), Disp: , Rfl:       Review of Systems:    Constitutional: Negative for fever, chills, and unexpected weight change. HENT: Negative for congestion, ear pain, rhinorrhea,  sore throat and trouble swallowing.    Eyes: Negative for photophobia, redness, itching. Respiratory: Negative for cough, shortness of breath and sputum. Cardiovascular: Negative for chest pain, palpitations and leg swelling. Gastrointestinal: Negative for nausea, vomiting, abdominal pain, diarrhea, constipation. Endocrine: Negative for cold intolerance, heat intolerance, polydipsia, polyphagia and polyuria. Genitourinary: Negative for dysuria, urgency, frequency, hematuria and flank pain. Musculoskeletal: Negative for myalgias, back pain, arthralgias and neck pain. Skin/Nail: Negative for rash, itching. Normal nails. Neurological: Negative for seizures, weakness, light-headedness, numbness and headaches. Hematological/ Lymph nodes: Negative for adenopathy. Does not bruise/bleed easily. Psychiatric/Behavioral: Has depression. Negative for suicidal ideas, anxiety, sleep disturbance and decreased concentration. Objective:   Physical Exam:  BP (!) 151/80 (Site: Left Upper Arm, Position: Sitting, Cuff Size: Medium Adult)   Pulse 62   Ht 5' 2\" (1.575 m)   Wt 125 lb 3.2 oz (56.8 kg)   SpO2 98%   BMI 22.90 kg/m²     Constitutional: Patient is oriented to person, place, and time. Patient appears well-developed and well-nourished. HENT:               Head: Normocephalic and atraumatic. Eyes: Conjunctivae and EOM are normal.                Neck: Normal range of motion. Cardiovascular: Normal rate, regular rhythm and normal heart sounds. Pulmonary/Chest: Effort normal and breath sounds normal.    Musculoskeletal: Normal range of motion. Neurological: Patient is alert and oriented to person, place, and time. Skin: Skin is warm and dry. Psychiatric: Patient has a normal mood and affect.  Patient behavior is normal.     Lab Review:    Orders Only on 06/17/2022   Component Date Value Ref Range Status    Hemoglobin A1C 06/17/2022 7.7  See comment % Final    eAG 06/17/2022 174.3  mg/dL Final           Assessment Jessica King was seen today for follow-up and diabetes. Diagnoses and all orders for this visit:    Type 2 diabetes mellitus with peripheral neuropathy [facility]  -     Hemoglobin A1C; Future  -     Comprehensive Metabolic Panel; Future  -     Fructosamine; Future  -     TSH; Future  -     T4, Free; Future  -     Dulaglutide (TRULICITY) 3.64 IM/1.2KB SOPN; Inject 0.75 mg into the skin once a week  -     Dulaglutide (TRULICITY) 8.59 MB/9.7PU SOPN; Lot # [id number] Ex 12/8/2023    Uncontrolled type 2 diabetes mellitus with hyperglycemia ([facility])  -     Hemoglobin A1C; Future  -     Comprehensive Metabolic Panel; Future  -     Fructosamine; Future  -     TSH; Future  -     T4, Free; Future  -     Dulaglutide (TRULICITY) 2.29 WD/7.9ST SOPN; Inject 0.75 mg into the skin once a week  -     Dulaglutide (TRULICITY) 6.01 RH/0.1LR SOPN; Lot # [id number] Ex 12/8/2023    Dyslipidemia associated with type 2 diabetes mellitus ([facility]  -     Hemoglobin A1C; Future  -     Comprehensive Metabolic Panel; Future  -     Fructosamine; Future  -     TSH; Future  -     T4, Free; Future  -     Dulaglutide (TRULICITY) 1.73 LE/1.2XK SOPN; Inject 0.75 mg into the skin once a week  -     Dulaglutide (TRULICITY) 3.72 ZX/6.4EO SOPN; Lot # [id number] Ex 12/8/2023    Statin intolerance  -     Hemoglobin A1C; Future  -     Comprehensive Metabolic Panel; Future  -     Fructosamine; Future  -     TSH; Future  -     T4, Free; Future  -     Dulaglutide (TRULICITY) 3.52 PG/7.8DJ SOPN; Inject 0.75 mg into the skin once a week  -     Dulaglutide (TRULICITY) 4.86 NU/4.1CV SOPN; Lot # [id number] Ex 12/8/2023    Vitamin D deficiency  -     Vitamin D 25 Hydroxy;  Future  -     Dulaglutide (TRULICITY) 4.89 MK/9.9YP SOPN; Lot # [id number] Ex 12/8/2023          1: Type 2 DM complicated with hyperglycemia   Controlled A1C 7.7% < 7.6% < 7.5%< 7.5% < 7.7%        Blood sugars are variables   She needs to work on diet, keep carbs under 30 with each meals   Refer to CDE    C/w Metformin ER 500mg, two tabs with breakfast and dinner  . She is tolerating well with fiber supplement. If unable to tolerate then stop metformin and start low dose insulin   Glipizide 10 mg with breakfast and dinner  Add Trulicity 8.18  mg weekly. Never had pancreatitis. No fam h/o thyroid cancer. All instructions provided in written. Check Blood sugars 2 times per day. Log them along with insulin and send them every 2 weeks. Call for blood sugars less than 60 or more than 400. Eye exam: Everything 3 months denies DR. Jalyn Greene Glaucoma present. Foot exam:  March 2022 . Neuropathic pain at night. Deformity/amputation: absent  Skin lesions/pre-ulcerative calluses: absent  Edema: right- negative, left- negative  Sensory exam: Monofilament sensation: normal  Pulses: normal, Vibration (128 Hz): severely reduced , right worse than left     April 2021: Normal B12 and folate      Renal screen: Oct 2021    TSH screen:March 2021     2: HTN   Slightly high     3: Hyperlipidemia with vascular   LDL: 132 < 161 , HDL: 48, TGs: 342  < 187 March 2021    Allergic to statins (crestor, pitavastatin pravachol) and zetia   Does not want to take statins     Repatha started by [facility] Cardiologist     4: Hypercalcemia   Noted in the labs   Never had kidney stones   10.1 -11.4 since Jan 2020   Ca: 10.8, PTH 23 April 2021   Vit D 71 - April 2021 .     Ca 10.3, PTH 36.6, Vit D 42 - Oct 2021   Ca 9.7 - March 2022    Off Vit D 5,000 units three days a week        A1c, Vit D, TSH, Free T4,  CMP, fructosamine  today     A1C may be low because of transfusions     RTC in 3 months with logs     She does labs at [facility] or [facility]   Greater than 50% of this visit was spent in general counseling regarding obesity, diet, exercise, importance of adherence to insulin regime, recognition and treatment of hypo and hyperglycemia,  glucose logging, proper diabetes management, diabetic complications with poor management and the importance of glycemic control in order to avoid the complications of diabetes. Risks and potential complications of diabetes were reviewed with the patient. Diabetes health maintenance plan and follow-up were discussed and understood by the patient. We reviewed the importance of medication compliance and regular follow-up. Aggressive lifestyle modification was encouraged. Exercise Counselling: This patient is a candidate for regular physical exercise. Instructions to perform the following types of exercise:  Swimming or water aerobic exercise  Brisk walking  Playing tennis  Stationary bicycle or elliptical indoor  Low impact aerobic exercise    Instructions given to exercise for the following duration:  30 minutes a day for five-seven days per week.     Following instructions for being active throughout the day in addition to formal exercise:  Walk instead of drive whenever possible  Take the stairs instead of the elevator  Work in the garden  Park to the far end of the parking lot to add more walking steps to destination      Electronically signed by Ava Leong MD on 10/4/2022 at 12:31 PM

## 2022-10-05 ENCOUNTER — TELEPHONE (OUTPATIENT)
Dept: ENDOCRINOLOGY | Age: 72
End: 2022-10-05

## 2022-10-05 LAB
ESTIMATED AVERAGE GLUCOSE: 162.8 MG/DL
HBA1C MFR BLD: 7.3 %

## 2022-10-05 NOTE — TELEPHONE ENCOUNTER
Call from patient that she is experiencing some side effects possibly caused by Rx Trulicity  Pt stated that she is having SOB, chest and lethargic.  She stated that these symptoms started this morning and has continued throughout the day   Pt stated that it feels like someone is squeezing her heart   I asked pt if she has attempted to go to the emergency or contact her PCP she stated that she hasn't that she has been having a hard time moving around today because she's feels exhausted   Pt was seen yesterday 10/4/22  Pt is scheduled to be seen with new PCP on 10/13/22    Please advise

## 2022-10-05 NOTE — TELEPHONE ENCOUNTER
Per Dr Jones Day patient should go to the ER to be evaluated. Patient was informed and agreeable to this plan.

## 2022-10-06 LAB — FRUCTOSAMINE: 298 UMOL/L (ref 205–285)

## 2022-12-18 DIAGNOSIS — E83.52 HYPERCALCEMIA: ICD-10-CM

## 2022-12-18 DIAGNOSIS — E11.65 UNCONTROLLED TYPE 2 DIABETES MELLITUS WITH HYPERGLYCEMIA (HCC): ICD-10-CM

## 2022-12-18 DIAGNOSIS — I10 ESSENTIAL HYPERTENSION: ICD-10-CM

## 2022-12-18 DIAGNOSIS — E11.42 TYPE 2 DIABETES MELLITUS WITH PERIPHERAL NEUROPATHY (HCC): ICD-10-CM

## 2022-12-19 RX ORDER — GLIPIZIDE 5 MG/1
TABLET ORAL
Qty: 60 TABLET | Refills: 7 | OUTPATIENT
Start: 2022-12-19

## 2022-12-19 NOTE — TELEPHONE ENCOUNTER
Requested Refill:   Requested Prescriptions     Pending Prescriptions Disp Refills    glipiZIDE (GLUCOTROL) 5 MG tablet [Pharmacy Med Name: glipiZIDE 5 MG TABLET] 60 tablet 7     Sig: TAKE ONE TABLET BY MOUTH TWICE DAILY BEFORE MEALS       Last refilled: 6/13/2022 #60 with 2 refills     Last Appt: 10/4/2022  Next Appt: needed for refill

## 2023-01-18 ENCOUNTER — TELEPHONE (OUTPATIENT)
Dept: ENDOCRINOLOGY | Age: 73
End: 2023-01-18

## 2023-01-18 DIAGNOSIS — E11.42 TYPE 2 DIABETES MELLITUS WITH PERIPHERAL NEUROPATHY (HCC): Primary | ICD-10-CM

## 2023-01-18 NOTE — TELEPHONE ENCOUNTER
Pt is requesting a referral for Endo, she found a doctor closer to her.    Their fax # [phone]    They are also requesting the last 4 office notes be sent as well

## 2023-02-06 ENCOUNTER — TELEPHONE (OUTPATIENT)
Dept: ENDOCRINOLOGY | Age: 73
End: 2023-02-06

## 2023-02-06 DIAGNOSIS — E11.65 UNCONTROLLED TYPE 2 DIABETES MELLITUS WITH HYPERGLYCEMIA (HCC): ICD-10-CM

## 2023-02-06 DIAGNOSIS — E11.42 TYPE 2 DIABETES MELLITUS WITH PERIPHERAL NEUROPATHY (HCC): ICD-10-CM

## 2023-02-06 RX ORDER — BLOOD SUGAR DIAGNOSTIC
2 STRIP MISCELLANEOUS DAILY
Qty: 100 EACH | Refills: 1 | Status: SHIPPED | OUTPATIENT
Start: 2023-02-06

## 2023-02-06 NOTE — TELEPHONE ENCOUNTER
Pt requesting refill  [place] strip       [facility] [id number], [address] [address]   Phone:  [phone]  Fax:  [phone]

## 2023-02-15 PROBLEM — E87.1 HYPONATREMIA: Status: ACTIVE | Noted: 2022-08-02

## 2023-02-15 PROBLEM — Z95.1 S/P CABG X 3: Status: ACTIVE | Noted: 2022-08-08

## 2023-02-15 PROBLEM — R07.9 CHEST PAIN: Status: ACTIVE | Noted: 2022-08-02

## 2023-03-08 ENCOUNTER — OFFICE VISIT (OUTPATIENT)
Dept: ENDOCRINOLOGY | Age: 73
End: 2023-03-08
Payer: MEDICAID

## 2023-03-08 VITALS
DIASTOLIC BLOOD PRESSURE: 83 MMHG | OXYGEN SATURATION: 97 % | HEIGHT: 62 IN | HEART RATE: 80 BPM | WEIGHT: 124.8 LBS | BODY MASS INDEX: 22.97 KG/M2 | SYSTOLIC BLOOD PRESSURE: 165 MMHG

## 2023-03-08 DIAGNOSIS — E55.9 VITAMIN D DEFICIENCY: ICD-10-CM

## 2023-03-08 DIAGNOSIS — E83.52 HYPERCALCEMIA: ICD-10-CM

## 2023-03-08 DIAGNOSIS — E11.69 DYSLIPIDEMIA ASSOCIATED WITH TYPE 2 DIABETES MELLITUS (HCC): ICD-10-CM

## 2023-03-08 DIAGNOSIS — I10 ESSENTIAL HYPERTENSION: ICD-10-CM

## 2023-03-08 DIAGNOSIS — E78.5 DYSLIPIDEMIA ASSOCIATED WITH TYPE 2 DIABETES MELLITUS (HCC): ICD-10-CM

## 2023-03-08 DIAGNOSIS — Z78.9 STATIN INTOLERANCE: ICD-10-CM

## 2023-03-08 DIAGNOSIS — E11.65 UNCONTROLLED TYPE 2 DIABETES MELLITUS WITH HYPERGLYCEMIA (HCC): ICD-10-CM

## 2023-03-08 DIAGNOSIS — E11.42 TYPE 2 DIABETES MELLITUS WITH PERIPHERAL NEUROPATHY (HCC): Primary | ICD-10-CM

## 2023-03-08 LAB — DIABETIC RETINOPATHY: NEGATIVE

## 2023-03-08 PROCEDURE — 3077F SYST BP >= 140 MM HG: CPT | Performed by: INTERNAL MEDICINE

## 2023-03-08 PROCEDURE — 99214 OFFICE O/P EST MOD 30 MIN: CPT | Performed by: INTERNAL MEDICINE

## 2023-03-08 PROCEDURE — 1123F ACP DISCUSS/DSCN MKR DOCD: CPT | Performed by: INTERNAL MEDICINE

## 2023-03-08 PROCEDURE — 3078F DIAST BP <80 MM HG: CPT | Performed by: INTERNAL MEDICINE

## 2023-03-08 RX ORDER — INSULIN GLARGINE 100 [IU]/ML
6 INJECTION, SOLUTION SUBCUTANEOUS EVERY MORNING
Qty: 5 ADJUSTABLE DOSE PRE-FILLED PEN SYRINGE | Refills: 1 | Status: SHIPPED | OUTPATIENT
Start: 2023-03-08

## 2023-03-08 RX ORDER — INSULIN GLARGINE 100 [IU]/ML
INJECTION, SOLUTION SUBCUTANEOUS
Qty: 1 ADJUSTABLE DOSE PRE-FILLED PEN SYRINGE | Refills: 0 | COMMUNITY
Start: 2023-03-08

## 2023-03-08 NOTE — PROGRESS NOTES
Patient ID:   Esthela Martino is a 74 y.o. female    Chief Complaint:   Esthela Martino presents for an evaluation of Type 2 Diabetes Mellitus , Hyperlipidemia and hypertension. Subjective:   Type 2 Diabetes Mellitus diagnosed in . Never been on insulin   Invokana caused confusion   Actos confused confusion   Januvia caused confusion   Trulicity caused hives, breathing issues     Not happy with diabetes care with pcp   Recently fired her PCP     Does not want to take insulin     Metformin ER 500mg, two tabs with breakfast and dinner. It is going through. Riomet on backorders    She stated taking OTC supplement fiber and diarrhea is gone. Glipizide 10 mg with breakfast and dinner     She does not have help for meals   She was suppose to get meals on wheels       Checks blood sugars 0-1 times per day. Reviewed meter: 149-202, 221. 21 days average 182     AM:   Lunch:  Supper:   HS:     Hypoglycemias: None     Meals: three (dinner is bigger), crackers at bedtime. Snacks 2 per day (crackers, sweets (sugar free cookies), pizza rolls). Sometimes glucerna drinks for snacks. Drinks juices sometimes. Sometimes she has diet sodas. Exercise: She says she is always active. Denies chest pain, exertional dyspnea. CAD s/p CABG - Aug 2022   Family history of CAD: Brother  of MI at ag 64. Both parents had stroke. Denies smoking/ alcohol.    Currently on ASA 81 mg daily     The following portions of the patient's history were reviewed and updated as appropriate:         Family History   Problem Relation Age of Onset    High Blood Pressure Mother     Stroke Mother     Stroke Father          Social History     Socioeconomic History    Marital status: Unknown     Spouse name: Not on file    Number of children: Not on file    Years of education: Not on file    Highest education level: Not on file   Occupational History    Not on file   Tobacco Use    Smoking status: Never    Smokeless tobacco: Never   Vaping Use    Vaping Use: Never used   Substance and Sexual Activity    Alcohol use: Not Currently    Drug use: Never    Sexual activity: Not on file   Other Topics Concern    Not on file   Social History Narrative    Not on file     Social Determinants of Health     Financial Resource Strain: Not on file   Food Insecurity: Not on file   Transportation Needs: Not on file   Physical Activity: Not on file   Stress: Not on file   Social Connections: Not on file   Intimate Partner Violence: Not on file   Housing Stability: Not on file       Past Medical History:   Diagnosis Date    Anemia     Anxiety     Biliary colic     Diabetes mellitus with no complication ([facility]     [facility]     DVT prophylaxis     Gait abnormality     Glaucoma     History of calculus of gallbladder     Hypercalcemia     Hyperlipidemia     Hypertension     Hypertriglyceridemia     Insomnia     Osteoarthritis     Pelvic prolapse     PVD (peripheral vascular disease) ([facility])     Sensation of cold in lower extremity     Stress bladder incontinence, female     Urinary incontinence     Urinary incontinence     Vitamin D deficiency        Past Surgical History:   Procedure Laterality Date    BLADDER SURGERY      CHOLECYSTECTOMY      DENTAL SURGERY      Root Canal    PARTIAL HYSTERECTOMY (CERVIX NOT REMOVED)      VAGINA SURGERY             Allergies   Allergen Reactions    Latex Rash     red spots, no hives    Dulaglutide Hives    Statins Hives, Shortness Of Breath, Swelling and Anaphylaxis    Trazodone Hives    Canagliflozin      Other reaction(s): Other (See Comments)  Confusion    Escitalopram Hives    Ezetimibe Hives    Gabapentin      Other reaction(s): Other (See Comments)  confusion    Lamotrigine Hives    Naltrexone Hives    Other      Cigarette smoke    Perphenazine      Other reaction(s): Other (See Comments)  unsure    Pioglitazone      Other reaction(s):  Other (See Comments)  confusion    Pitavastatin Hives    Prednisone Hives    Sitagliptin      Other reaction(s): Other (See Comments)  confusion    Sulfamethoxazole-Trimethoprim Itching         Current Outpatient Medications:     insulin glargine (LANTUS SOLOSTAR) 100 UNIT/ML injection pen, Inject 6 Units into the skin every morning, Disp: 5 Adjustable Dose Pre-filled Pen Syringe, Rfl: 1    Insulin Pen Needle 32G X 4 MM MISC, 1 each by Does not apply route daily, Disp: 100 each, Rfl: 3    ONETOUCH VERIO strip, 2 each by In Vitro route daily As needed. , Disp: 100 each, Rfl: 1    aspirin 325 MG tablet, Take 325 mg by mouth daily, Disp: , Rfl:     lisinopril (PRINIVIL;ZESTRIL) 20 MG tablet, Take 40 mg by mouth daily, Disp: , Rfl:     metoprolol tartrate (LOPRESSOR) 25 MG tablet, Take 25 mg by mouth 2 times daily, Disp: , Rfl:     glipiZIDE (GLUCOTROL) 10 MG tablet, TAKE ONE TABLET BY MOUTH TWICE A DAY BEFORE MEALS, Disp: 60 tablet, Rfl: 2    metFORMIN (GLUCOPHAGE-XR) 500 MG extended release tablet, TAKE TWO TABLETS BY MOUTH TWICE A DAY WITH MEALS, Disp: 120 tablet, Rfl: 11    Lancets (ONETOUCH DELICA PLUS [id number]) MISC, USE TO TEST TWO TO THREE TIMES A DAY, Disp: 100 each, Rfl: 11    clonazePAM (KLONOPIN) 0.5 MG tablet, Take 0.5 mg by mouth as needed. , Disp: , Rfl:     fluticasone (FLONASE) 50 MCG/ACT nasal spray, 1 spray by Nasal route as needed, Disp: , Rfl:     ibuprofen (ADVIL;MOTRIN) 600 MG tablet, Take 600 mg by mouth every 6 hours as needed, Disp: , Rfl:     latanoprost (XALATAN) 0.005 % ophthalmic solution, Apply 1 drop to eye nightly, Disp: , Rfl:     meclizine (ANTIVERT) 25 MG tablet, Take 25 mg by mouth as needed, Disp: , Rfl:       Review of Systems:    Constitutional: Negative for fever, chills, and unexpected weight change. HENT: Negative for congestion, ear pain, rhinorrhea,  sore throat and trouble swallowing. Eyes: Negative for photophobia, redness, itching. Respiratory: Negative for cough, shortness of breath and sputum.    Cardiovascular: Negative for chest pain, palpitations and leg swelling. Gastrointestinal: Negative for nausea, vomiting, abdominal pain, diarrhea, constipation. Endocrine: Negative for cold intolerance, heat intolerance, polydipsia, polyphagia and polyuria. Genitourinary: Negative for dysuria, urgency, frequency, hematuria and flank pain. Musculoskeletal: Negative for myalgias, back pain, arthralgias and neck pain. Skin/Nail: Negative for rash, itching. Normal nails. Neurological: Negative for seizures, weakness, light-headedness, numbness and headaches. Hematological/ Lymph nodes: Negative for adenopathy. Does not bruise/bleed easily. Psychiatric/Behavioral: Has depression. Negative for suicidal ideas, anxiety, sleep disturbance and decreased concentration. Objective:   Physical Exam:  BP (!) 149/79 (Site: Left Upper Arm, Position: Sitting, Cuff Size: Medium Adult)   Pulse 80   Ht 5' 2\" (1.575 m)   Wt 124 lb 12.8 oz (56.6 kg)   SpO2 97%   BMI 22.83 kg/m²     Constitutional: Patient is oriented to person, place, and time. Patient appears well-developed and well-nourished. HENT:               Head: Normocephalic and atraumatic. Eyes: Conjunctivae and EOM are normal.                Neck: Normal range of motion. Cardiovascular: Normal rate, regular rhythm and normal heart sounds. Pulmonary/Chest: Effort normal and breath sounds normal.   Musculoskeletal: Normal range of motion. Neurological: Patient is alert and oriented to person, place, and time. Skin: Skin is warm and dry. Psychiatric: Patient has a normal mood and affect.  Patient behavior is normal.     Lab Review:    Orders Only on 10/04/2022   Component Date Value Ref Range Status    T4 Free 10/04/2022 1.2  0.9 - 1.8 ng/dL Final    TSH 10/04/2022 1.14  0.27 - 4.20 uIU/mL Final    Fructosamine 10/04/2022 298 (A)  205 - 285 umol/L Final    Vit D, 25-Hydroxy 10/04/2022 48.1  >=30 ng/mL Final    Sodium 10/04/2022 138  136 - 145 mmol/L Final Potassium 10/04/2022 4.0  3.5 - 5.1 mmol/L Final    Chloride 10/04/2022 98 (A)  99 - 110 mmol/L Final    CO2 10/04/2022 27  21 - 32 mmol/L Final    Anion Gap 10/04/2022 13  3 - 16 Final    Glucose 10/04/2022 116 (A)  70 - 99 mg/dL Final    BUN 10/04/2022 14  7 - 20 mg/dL Final    Creatinine 10/04/2022 0.6  0.6 - 1.2 mg/dL Final    GFR Non- 10/04/2022 >60  >60 Final    GFR  10/04/2022 >60  >60 Final    Calcium 10/04/2022 10.0  8.3 - 10.6 mg/dL Final    Total Protein 10/04/2022 6.7  6.4 - 8.2 g/dL Final    Albumin 10/04/2022 4.4  3.4 - 5.0 g/dL Final    Albumin/Globulin Ratio 10/04/2022 1.9  1.1 - 2.2 Final    Total Bilirubin 10/04/2022 0.3  0.0 - 1.0 mg/dL Final    Alkaline Phosphatase 10/04/2022 127  40 - 129 U/L Final    ALT 10/04/2022 12  10 - 40 U/L Final    AST 10/04/2022 12 (A)  15 - 37 U/L Final    Hemoglobin A1C 10/04/2022 7.3  See comment % Final    eAG 10/04/2022 162.8  mg/dL Final   Orders Only on 06/17/2022   Component Date Value Ref Range Status    Hemoglobin A1C 06/17/2022 7.7  See comment % Final    eAG 06/17/2022 174.3  mg/dL Final           Assessment and Plan     Brooklyn Limb was seen today for follow-up and diabetes. Diagnoses and all orders for this visit:    Type 2 diabetes mellitus with peripheral neuropathy (HCC)  -     insulin glargine (LANTUS SOLOSTAR) 100 UNIT/ML injection pen; Inject 6 Units into the skin every morning  -     Insulin Pen Needle 32G X 4 MM MISC; 1 each by Does not apply route daily    Uncontrolled type 2 diabetes mellitus with hyperglycemia (HCC)  -     insulin glargine (LANTUS SOLOSTAR) 100 UNIT/ML injection pen; Inject 6 Units into the skin every morning  -     Insulin Pen Needle 32G X 4 MM MISC; 1 each by Does not apply route daily    Essential hypertension  -     insulin glargine (LANTUS SOLOSTAR) 100 UNIT/ML injection pen;  Inject 6 Units into the skin every morning  -     Insulin Pen Needle 32G X 4 MM MISC; 1 each by Does not apply route daily    Hypercalcemia  -     insulin glargine (LANTUS SOLOSTAR) 100 UNIT/ML injection pen; Inject 6 Units into the skin every morning  -     Insulin Pen Needle 32G X 4 MM MISC; 1 each by Does not apply route daily    Dyslipidemia associated with type 2 diabetes mellitus (HCC)  -     insulin glargine (LANTUS SOLOSTAR) 100 UNIT/ML injection pen; Inject 6 Units into the skin every morning  -     Insulin Pen Needle 32G X 4 MM MISC; 1 each by Does not apply route daily    Statin intolerance  -     insulin glargine (LANTUS SOLOSTAR) 100 UNIT/ML injection pen; Inject 6 Units into the skin every morning  -     Insulin Pen Needle 32G X 4 MM MISC; 1 each by Does not apply route daily    Vitamin D deficiency  -     insulin glargine (LANTUS SOLOSTAR) 100 UNIT/ML injection pen; Inject 6 Units into the skin every morning  -     Insulin Pen Needle 32G X 4 MM MISC; 1 each by Does not apply route daily            1: Type 2 DM complicated with hyperglycemia   Controlled A1C 8.1% <  7.3% < 7.7% < 7.6% < 7.5%< 7.5% < 7.7%        Blood sugars are variables   She needs to work on diet, keep carbs under 30 with each meals   Refer to CDE    C/w Metformin ER 500mg, two tabs with breakfast and dinner  . Glipizide 10 mg with breakfast and dinner    Start Lantus 6 units daily in am   Insulin teaching done          All instructions provided in written. Check Blood sugars 2 times per day. Log them along with insulin and send them every 2 weeks. Call for blood sugars less than 60 or more than 400. Eye exam: Normal eye exam Nov 2022, no Dr. note reviewed. Glaucoma present. Foot exam:  March 2022 . Neuropathic pain at night. Deformity/amputation: absent  Skin lesions/pre-ulcerative calluses: absent  Edema: right- negative, left- negative  Sensory exam: Monofilament sensation: normal  Pulses: normal, Vibration (128 Hz): severely reduced , right worse than left     April 2021: Normal B12 and folate      Renal screen:  Oct 2021    TSH screen: Oct 2022     NS with CDE - Sep 2022     2: HTN   Slightly high     3: Hyperlipidemia with vascular   LDL: 132 < 161 , HDL: 48, TGs: 342  < 187 March 2021    Allergic to statins (crestor, pitavastatin pravachol) and zetia   Does not want to take statins   On Repatha as per [facility] Cardiologist     4: Hypercalcemia   Noted in the labs   Never had kidney stones   10.1 -11.4 since Jan 2020   Ca: 10.8, PTH 23 April 2021   Vit D 69 - April 2021 . Ca 10.3, PTH 36.6, Vit D 42 - Oct 2021   Ca 9.7 - March 2022    Off Vit D 5,000 units three days a week      Ca 10, Vit D 48 - Oct 2022   MVT daily         RTC in 3 months with logs     She does labs at [facility] or [place] [address].:   Greater than 50% of this visit was spent in general counseling regarding obesity, diet, exercise, importance of adherence to insulin regime, recognition and treatment of hypo and hyperglycemia,  glucose logging, proper diabetes management, diabetic complications with poor management and the importance of glycemic control in order to avoid the complications of diabetes. Risks and potential complications of diabetes were reviewed with the patient. Diabetes health maintenance plan and follow-up were discussed and understood by the patient. We reviewed the importance of medication compliance and regular follow-up. Aggressive lifestyle modification was encouraged. Exercise Counselling: This patient is a candidate for regular physical exercise. Instructions to perform the following types of exercise:  Swimming or water aerobic exercise  Brisk walking  Playing tennis  Stationary bicycle or elliptical indoor  Low impact aerobic exercise    Instructions given to exercise for the following duration:  30 minutes a day for five-seven days per week.     Following instructions for being active throughout the day in addition to formal exercise:  Walk instead of drive whenever possible  Take the stairs instead of the elevator  Work in the [address] to the far end of the parking lot to add more walking steps to destination      Electronically signed by Kavitha Villatoro MD on 3/8/2023 at 4:31 PM

## 2023-03-22 ENCOUNTER — TELEPHONE (OUTPATIENT)
Dept: ENDOCRINOLOGY | Age: 73
End: 2023-03-22

## 2023-03-22 NOTE — TELEPHONE ENCOUNTER
I called patient second time, no response. Please ask patient blood glucose log for the last 3 days, including any fingersticks she would have from 3/23/2023.

## 2023-03-22 NOTE — TELEPHONE ENCOUNTER
Pt states her sugars have been running high in the 200 range. Pt gave 219 @ 130pm and currently 237 but just ate. She states that she has diarrhea and she states that in her stool the Metformin ER pills (present in stool) have not been absorbed in her stomach. She states she has been taking Metformin for 22yrs and does not know why this is happening. Pt states she also takes Glimipiride and [employer].  Pt thinks that's why her sugars are running high is the Metformin is going right thru her GI tract    [facility]# [phone]

## 2023-03-23 NOTE — TELEPHONE ENCOUNTER
Spoke with patient and her daughter, she is visiting her daughter out of town. She is currently taking glipizide XL 5 mg tablet twice a day, Basaglar 6 units around lunch. Patient stated that she sees her metformin in her stool unchanged and she thinks that it is not absorbed, therefore she is not taking it. I suggested that she still should be taking metformin as prescribed. Patient stated that she will not take it because it is not working for above reason. If blood glucose is 100-120 in the morning, she does not take Basaglar until lunch. Advised patient that she should monitor her diet and carbohydrate intake, because blood glucose goes higher during the day after she eats. Patient stated that she eats very little. Advised patient that I will defer decision to Dr. Karlo Mendez regarding further diabetes management. In meantime I advised again that she should continue medication regimen as prescribed by Dr. Karlo Mendez. Patient would like to receive a phone call on Monday.

## 2023-03-23 NOTE — TELEPHONE ENCOUNTER
Pt called back w/ readings    Mon  10am-  115            1pm-  337             4pm-  108             5pm-  142             8:30pm-  200    Tues    9am-  175             10am-  200              11am-   219               6pm-   250               10pm-  194    Wed     1030am-  142              130pm-    219               330pm-  237    Thurs   830am-  114              1230pm-  289  (ate cereal)    Contact Information   [phone] (Home Phone)   [phone] [facility])

## 2023-04-28 ENCOUNTER — TELEPHONE (OUTPATIENT)
Dept: ENDOCRINOLOGY | Age: 73
End: 2023-04-28

## 2023-04-28 NOTE — TELEPHONE ENCOUNTER
Spoke to patient     Increase Lantus to 8 units   She is not taking metformin   Restart metformin 500 mg bid

## 2023-04-28 NOTE — TELEPHONE ENCOUNTER
Call from patient stating that Dr. Jenny Grey started her on Insulin glargine 3/8/23    She stated that the insulin was working the first 2 weeks but after the first 2 weeks her BS levels has been 200+    She stated that this morning her BS was 209    Pt is wanting to know what Dr. Jenny Grey would advise her to do?     Please advise   CB# [phone]

## 2023-04-28 NOTE — TELEPHONE ENCOUNTER
Pt called back, gave pt verbatim message.      BS readings    4/24/23 - 219 am    4/25/23 - 220 am     4/26/23 - 209 am, 371 dinner    4/27/23 - 221 am     4/28/23 - 207 am     Pt stated that she only had her readings for the morning     Please advise

## 2023-05-23 DIAGNOSIS — E11.69 DYSLIPIDEMIA ASSOCIATED WITH TYPE 2 DIABETES MELLITUS (HCC): ICD-10-CM

## 2023-05-23 DIAGNOSIS — E78.5 DYSLIPIDEMIA ASSOCIATED WITH TYPE 2 DIABETES MELLITUS (HCC): ICD-10-CM

## 2023-05-23 DIAGNOSIS — Z78.9 STATIN INTOLERANCE: ICD-10-CM

## 2023-05-23 DIAGNOSIS — E55.9 VITAMIN D DEFICIENCY: ICD-10-CM

## 2023-05-23 DIAGNOSIS — I10 ESSENTIAL HYPERTENSION: ICD-10-CM

## 2023-05-23 DIAGNOSIS — E83.52 HYPERCALCEMIA: ICD-10-CM

## 2023-05-23 DIAGNOSIS — E11.65 UNCONTROLLED TYPE 2 DIABETES MELLITUS WITH HYPERGLYCEMIA (HCC): ICD-10-CM

## 2023-05-23 DIAGNOSIS — E11.42 TYPE 2 DIABETES MELLITUS WITH PERIPHERAL NEUROPATHY (HCC): ICD-10-CM

## 2023-05-23 RX ORDER — INSULIN GLARGINE 100 [IU]/ML
INJECTION, SOLUTION SUBCUTANEOUS
Qty: 3 ML | Refills: 0 | Status: SHIPPED | OUTPATIENT
Start: 2023-05-23

## 2023-05-23 RX ORDER — METFORMIN HYDROCHLORIDE 500 MG/1
TABLET, EXTENDED RELEASE ORAL
Qty: 120 TABLET | Refills: 0 | Status: SHIPPED | OUTPATIENT
Start: 2023-05-23

## 2023-05-23 NOTE — TELEPHONE ENCOUNTER
Requested Refill:   Requested Prescriptions     Pending Prescriptions Disp Refills    metFORMIN (GLUCOPHAGE-XR) 500 MG extended release tablet [Pharmacy Med Name: METFORMIN HCL  MG TABLET] 120 tablet 11     Sig: TAKE TWO TABLETS BY MOUTH TWICE A DAY WITH MEALS    LANTUS SOLOSTAR 100 UNIT/ML injection pen [Pharmacy Med Name: LANTUS SOLOSTAR 100 UNIT/ML] 3 mL      Sig: INJECT 6 UNITS UNDER THE SKIN EVERY MORNING     Metformin:3/17/2022 # 120 with 11 refills   Lantus: 3/8/2023 # 5 pens with 1 refill     Last Appt: 3/8/2023  Next Appt: 6/8/2023

## 2023-06-07 ENCOUNTER — TELEPHONE (OUTPATIENT)
Dept: PHARMACY | Facility: CLINIC | Age: 73
End: 2023-06-07

## 2023-06-07 PROBLEM — F41.8 DEPRESSION WITH ANXIETY: Status: ACTIVE | Noted: 2023-01-17

## 2023-06-08 ENCOUNTER — OFFICE VISIT (OUTPATIENT)
Dept: ENDOCRINOLOGY | Age: 73
End: 2023-06-08

## 2023-06-08 VITALS
HEIGHT: 62 IN | OXYGEN SATURATION: 97 % | DIASTOLIC BLOOD PRESSURE: 68 MMHG | WEIGHT: 127 LBS | SYSTOLIC BLOOD PRESSURE: 147 MMHG | BODY MASS INDEX: 23.37 KG/M2 | HEART RATE: 78 BPM

## 2023-06-08 DIAGNOSIS — T46.6X5A ADVERSE EFFECT OF STATIN: ICD-10-CM

## 2023-06-08 DIAGNOSIS — E11.65 UNCONTROLLED TYPE 2 DIABETES MELLITUS WITH HYPERGLYCEMIA (HCC): Primary | ICD-10-CM

## 2023-06-08 DIAGNOSIS — E11.65 UNCONTROLLED TYPE 2 DIABETES MELLITUS WITH HYPERGLYCEMIA (HCC): ICD-10-CM

## 2023-06-08 DIAGNOSIS — I10 ESSENTIAL HYPERTENSION: ICD-10-CM

## 2023-06-08 DIAGNOSIS — Z78.9 STATIN INTOLERANCE: ICD-10-CM

## 2023-06-08 DIAGNOSIS — E55.9 VITAMIN D DEFICIENCY: ICD-10-CM

## 2023-06-08 DIAGNOSIS — E11.69 DYSLIPIDEMIA ASSOCIATED WITH TYPE 2 DIABETES MELLITUS (HCC): ICD-10-CM

## 2023-06-08 DIAGNOSIS — E78.5 DYSLIPIDEMIA ASSOCIATED WITH TYPE 2 DIABETES MELLITUS (HCC): ICD-10-CM

## 2023-06-08 DIAGNOSIS — E11.42 TYPE 2 DIABETES MELLITUS WITH PERIPHERAL NEUROPATHY (HCC): ICD-10-CM

## 2023-06-08 DIAGNOSIS — E83.52 HYPERCALCEMIA: ICD-10-CM

## 2023-06-08 LAB
25(OH)D3 SERPL-MCNC: 48.1 NG/ML
ALBUMIN SERPL-MCNC: 4.6 G/DL (ref 3.4–5)
ALBUMIN/GLOB SERPL: 1.6 {RATIO} (ref 1.1–2.2)
ALP SERPL-CCNC: 114 U/L (ref 40–129)
ALT SERPL-CCNC: 21 U/L (ref 10–40)
ANION GAP SERPL CALCULATED.3IONS-SCNC: 16 MMOL/L (ref 3–16)
AST SERPL-CCNC: 21 U/L (ref 15–37)
BILIRUB SERPL-MCNC: <0.2 MG/DL (ref 0–1)
BUN SERPL-MCNC: 17 MG/DL (ref 7–20)
CALCIUM SERPL-MCNC: 10.5 MG/DL (ref 8.3–10.6)
CHLORIDE SERPL-SCNC: 97 MMOL/L (ref 99–110)
CO2 SERPL-SCNC: 23 MMOL/L (ref 21–32)
CREAT SERPL-MCNC: 0.9 MG/DL (ref 0.6–1.2)
CREAT UR-MCNC: 42.4 MG/DL (ref 28–259)
GFR SERPLBLD CREATININE-BSD FMLA CKD-EPI: >60 ML/MIN/{1.73_M2}
GLUCOSE SERPL-MCNC: 195 MG/DL (ref 70–99)
MICROALBUMIN UR DL<=1MG/L-MCNC: <1.2 MG/DL
MICROALBUMIN/CREAT UR: NORMAL MG/G (ref 0–30)
POTASSIUM SERPL-SCNC: 3.7 MMOL/L (ref 3.5–5.1)
PROT SERPL-MCNC: 7.4 G/DL (ref 6.4–8.2)
SODIUM SERPL-SCNC: 136 MMOL/L (ref 136–145)

## 2023-06-08 RX ORDER — GLIPIZIDE 5 MG/1
TABLET, FILM COATED, EXTENDED RELEASE ORAL
COMMUNITY
Start: 2023-03-14 | End: 2023-06-08

## 2023-06-08 RX ORDER — GLIPIZIDE 5 MG/1
5 TABLET ORAL
Qty: 60 TABLET | Refills: 3 | Status: SHIPPED | OUTPATIENT
Start: 2023-06-08

## 2023-06-08 RX ORDER — LISINOPRIL 40 MG/1
TABLET ORAL
COMMUNITY
Start: 2023-03-14

## 2023-06-08 RX ORDER — AMLODIPINE BESYLATE 5 MG/1
5 TABLET ORAL
COMMUNITY
Start: 2023-03-13

## 2023-06-08 RX ORDER — INSULIN GLARGINE 100 [IU]/ML
10 INJECTION, SOLUTION SUBCUTANEOUS EVERY MORNING
Qty: 1 ADJUSTABLE DOSE PRE-FILLED PEN SYRINGE | Refills: 0 | Status: SHIPPED | OUTPATIENT
Start: 2023-06-08

## 2023-06-08 NOTE — PROGRESS NOTES
refused Repatha as per [facility] Cardiologist     4: Hypercalcemia   Noted in the labs   Never had kidney stones   10.1 -11.4 since Jan 2020   Ca: 10.8, PTH 23 April 2021   Vit D 69 - April 2021 . Ca 10.3, PTH 36.6, Vit D 42 - Oct 2021   Ca 9.7 - March 2022    Off Vit D 5,000 units three days a week      Ca 10, Vit D 48 - Oct 2022   MVT daily       CMP, Vit D , A1C, MACR today     RTC in 3 months with logs     She does labs at [address] [place] [address] or [facility]   Greater than 50% of this visit was spent in general counseling regarding obesity, diet, exercise, importance of adherence to insulin regime, recognition and treatment of hypo and hyperglycemia,  glucose logging, proper diabetes management, diabetic complications with poor management and the importance of glycemic control in order to avoid the complications of diabetes. Risks and potential complications of diabetes were reviewed with the patient. Diabetes health maintenance plan and follow-up were discussed and understood by the patient. We reviewed the importance of medication compliance and regular follow-up. Aggressive lifestyle modification was encouraged. Exercise Counselling: This patient is a candidate for regular physical exercise. Instructions to perform the following types of exercise:  Swimming or water aerobic exercise  Brisk walking  Playing tennis  Stationary bicycle or elliptical indoor  Low impact aerobic exercise    Instructions given to exercise for the following duration:  30 minutes a day for five-seven days per week.     Following instructions for being active throughout the day in addition to formal exercise:  Walk instead of drive whenever possible  Take the stairs instead of the elevator  Work in the garden  Park to the far end of the parking lot to add more walking steps to destination      Electronically signed by Majo Balbuena MD on 6/8/2023 at 12:34 PM

## 2023-06-08 NOTE — PATIENT INSTRUCTIONS
C/w Metformin ER 500mg, two tabs with breakfast and dinner  . Change Glipizide to 5 mg with breakfast and dinner.  Please do not use XL     Change Lantus to 10 units daily in am

## 2023-06-09 LAB
EST. AVERAGE GLUCOSE BLD GHB EST-MCNC: 188.6 MG/DL
HBA1C MFR BLD: 8.2 %

## 2023-06-09 NOTE — TELEPHONE ENCOUNTER
Dr. Shreyas Cooper - thank you for review/response. Unfortunately CMS does not recognize \"statin intolerance\" as dx for exclusion from quality metric. If you would be willing dx \"adverse effect of statin\" should exclude patient from quality metric. Thank you!
I have the diagnosis \"statin intolerance\" .
Provider response noted, thank you! Pt had 6/8/23 OV - dx done that should exclude patient from quality metric, thank you!     Adverse effect of statin  T46.6X5A     For Pharmacy Admin Tracking Only    Program: [address] in place:  No  Recommendation Provided To: Provider: 1 via Note to Provider  Intervention Accepted By: Provider: 1  Gap Closed?: Yes   Time Spent (min): 15
Sure
Date Time Provider Frances Li   6/8/2023 12:20 PM Ally Davila MD [facility]       William Tinoco, PharmD, [facility]  Department, toll free: [phone], option 1

## 2023-07-03 DIAGNOSIS — E83.52 HYPERCALCEMIA: ICD-10-CM

## 2023-07-03 DIAGNOSIS — E11.65 UNCONTROLLED TYPE 2 DIABETES MELLITUS WITH HYPERGLYCEMIA (HCC): ICD-10-CM

## 2023-07-03 DIAGNOSIS — E11.42 TYPE 2 DIABETES MELLITUS WITH PERIPHERAL NEUROPATHY (HCC): ICD-10-CM

## 2023-07-03 DIAGNOSIS — I10 ESSENTIAL HYPERTENSION: ICD-10-CM

## 2023-07-03 RX ORDER — METFORMIN HYDROCHLORIDE 500 MG/1
TABLET, EXTENDED RELEASE ORAL
Qty: 120 TABLET | Refills: 0 | Status: SHIPPED | OUTPATIENT
Start: 2023-07-03

## 2023-07-03 NOTE — TELEPHONE ENCOUNTER
Requested Refill:   Requested Prescriptions     Pending Prescriptions Disp Refills    metFORMIN (GLUCOPHAGE-XR) 500 MG extended release tablet [Pharmacy Med Name: METFORMIN HCL  MG TABLET] 120 tablet 0     Sig: TAKE TWO TABLETS BY MOUTH TWICE A DAY WITH MEALS       Last refilled:  5/23/2023 # 120 tablets with no refills     Last Appt: 6/8/2023  Next Appt: 9/8/2023

## 2023-08-01 DIAGNOSIS — E11.42 TYPE 2 DIABETES MELLITUS WITH PERIPHERAL NEUROPATHY (HCC): ICD-10-CM

## 2023-08-01 DIAGNOSIS — E83.52 HYPERCALCEMIA: ICD-10-CM

## 2023-08-01 DIAGNOSIS — E11.65 UNCONTROLLED TYPE 2 DIABETES MELLITUS WITH HYPERGLYCEMIA (HCC): ICD-10-CM

## 2023-08-01 DIAGNOSIS — I10 ESSENTIAL HYPERTENSION: ICD-10-CM

## 2023-08-01 RX ORDER — METFORMIN HYDROCHLORIDE 500 MG/1
TABLET, EXTENDED RELEASE ORAL
Qty: 120 TABLET | Refills: 0 | Status: SHIPPED | OUTPATIENT
Start: 2023-08-01

## 2023-08-01 NOTE — TELEPHONE ENCOUNTER
Requested Refill:   Requested Prescriptions     Pending Prescriptions Disp Refills    metFORMIN (GLUCOPHAGE-XR) 500 MG extended release tablet [Pharmacy Med Name: METFORMIN HCL  MG TABLET] 120 tablet 0     Sig: TAKE TWO TABLETS BY MOUTH TWICE A DAY WITH MEALS       Last refilled: 7/3/2023 # 120 no refills     Last Appt: 6/8/2023  Next Appt: 9/8/2023

## 2023-09-10 DIAGNOSIS — E11.42 TYPE 2 DIABETES MELLITUS WITH PERIPHERAL NEUROPATHY (HCC): ICD-10-CM

## 2023-09-10 DIAGNOSIS — E11.65 UNCONTROLLED TYPE 2 DIABETES MELLITUS WITH HYPERGLYCEMIA (HCC): ICD-10-CM

## 2023-09-10 DIAGNOSIS — I10 ESSENTIAL HYPERTENSION: ICD-10-CM

## 2023-09-10 DIAGNOSIS — E83.52 HYPERCALCEMIA: ICD-10-CM

## 2023-09-11 RX ORDER — METFORMIN HYDROCHLORIDE 500 MG/1
TABLET, EXTENDED RELEASE ORAL
Qty: 120 TABLET | Refills: 0 | Status: SHIPPED | OUTPATIENT
Start: 2023-09-11

## 2023-09-11 NOTE — TELEPHONE ENCOUNTER
Requested Refill:   Requested Prescriptions     Pending Prescriptions Disp Refills    metFORMIN (GLUCOPHAGE-XR) 500 MG extended release tablet [Pharmacy Med Name: METFORMIN HCL  MG TABLET] 120 tablet 0     Sig: TAKE TWO TABLETS BY MOUTH TWICE A DAY WITH A MEAL       Last refilled : 8/1/2023 # 120 tablets with no refills     Last Appt: 6/8/2023  Next Appt: 9/19/2023

## 2023-09-19 ENCOUNTER — OFFICE VISIT (OUTPATIENT)
Dept: ENDOCRINOLOGY | Age: 73
End: 2023-09-19
Payer: MEDICARE

## 2023-09-19 VITALS
HEIGHT: 62 IN | WEIGHT: 123.4 LBS | OXYGEN SATURATION: 98 % | BODY MASS INDEX: 22.71 KG/M2 | SYSTOLIC BLOOD PRESSURE: 154 MMHG | DIASTOLIC BLOOD PRESSURE: 83 MMHG | HEART RATE: 78 BPM

## 2023-09-19 DIAGNOSIS — I10 ESSENTIAL HYPERTENSION: ICD-10-CM

## 2023-09-19 DIAGNOSIS — E83.52 HYPERCALCEMIA: ICD-10-CM

## 2023-09-19 DIAGNOSIS — E11.65 UNCONTROLLED TYPE 2 DIABETES MELLITUS WITH HYPERGLYCEMIA (HCC): ICD-10-CM

## 2023-09-19 DIAGNOSIS — E11.42 TYPE 2 DIABETES MELLITUS WITH PERIPHERAL NEUROPATHY (HCC): ICD-10-CM

## 2023-09-19 PROCEDURE — G8427 DOCREV CUR MEDS BY ELIG CLIN: HCPCS | Performed by: INTERNAL MEDICINE

## 2023-09-19 PROCEDURE — G8400 PT W/DXA NO RESULTS DOC: HCPCS | Performed by: INTERNAL MEDICINE

## 2023-09-19 PROCEDURE — 1090F PRES/ABSN URINE INCON ASSESS: CPT | Performed by: INTERNAL MEDICINE

## 2023-09-19 PROCEDURE — 99214 OFFICE O/P EST MOD 30 MIN: CPT | Performed by: INTERNAL MEDICINE

## 2023-09-19 PROCEDURE — 3077F SYST BP >= 140 MM HG: CPT | Performed by: INTERNAL MEDICINE

## 2023-09-19 PROCEDURE — 3017F COLORECTAL CA SCREEN DOC REV: CPT | Performed by: INTERNAL MEDICINE

## 2023-09-19 PROCEDURE — 1036F TOBACCO NON-USER: CPT | Performed by: INTERNAL MEDICINE

## 2023-09-19 PROCEDURE — 3052F HG A1C>EQUAL 8.0%<EQUAL 9.0%: CPT | Performed by: INTERNAL MEDICINE

## 2023-09-19 PROCEDURE — 3079F DIAST BP 80-89 MM HG: CPT | Performed by: INTERNAL MEDICINE

## 2023-09-19 PROCEDURE — G8420 CALC BMI NORM PARAMETERS: HCPCS | Performed by: INTERNAL MEDICINE

## 2023-09-19 PROCEDURE — 2022F DILAT RTA XM EVC RTNOPTHY: CPT | Performed by: INTERNAL MEDICINE

## 2023-09-19 PROCEDURE — 1123F ACP DISCUSS/DSCN MKR DOCD: CPT | Performed by: INTERNAL MEDICINE

## 2023-09-19 RX ORDER — GLIPIZIDE 5 MG/1
5 TABLET ORAL
Qty: 180 TABLET | Refills: 1 | Status: SHIPPED | OUTPATIENT
Start: 2023-09-19

## 2023-09-19 RX ORDER — METFORMIN HYDROCHLORIDE 500 MG/1
1000 TABLET, EXTENDED RELEASE ORAL
Qty: 180 TABLET | Refills: 1 | Status: SHIPPED | OUTPATIENT
Start: 2023-09-19

## 2023-09-19 RX ORDER — ASPIRIN 81 MG/1
81 TABLET ORAL DAILY
COMMUNITY

## 2023-09-20 ENCOUNTER — TELEPHONE (OUTPATIENT)
Dept: ENDOCRINOLOGY | Age: 73
End: 2023-09-20

## 2023-09-20 LAB
EST. AVERAGE GLUCOSE BLD GHB EST-MCNC: 157.1 MG/DL
HBA1C MFR BLD: 7.1 %

## 2023-09-20 NOTE — TELEPHONE ENCOUNTER
----- Message from Gertrudis Gutierrez MD sent at 9/20/2023  7:47 AM EDT -----  Please inform A1C is down to 7.1%   Excellent work!

## 2023-10-07 DIAGNOSIS — I10 ESSENTIAL HYPERTENSION: ICD-10-CM

## 2023-10-07 DIAGNOSIS — E11.65 UNCONTROLLED TYPE 2 DIABETES MELLITUS WITH HYPERGLYCEMIA (HCC): ICD-10-CM

## 2023-10-07 DIAGNOSIS — E55.9 VITAMIN D DEFICIENCY: ICD-10-CM

## 2023-10-07 DIAGNOSIS — E83.52 HYPERCALCEMIA: ICD-10-CM

## 2023-10-07 DIAGNOSIS — E11.42 TYPE 2 DIABETES MELLITUS WITH PERIPHERAL NEUROPATHY (HCC): ICD-10-CM

## 2023-10-07 DIAGNOSIS — E11.69 DYSLIPIDEMIA ASSOCIATED WITH TYPE 2 DIABETES MELLITUS (HCC): ICD-10-CM

## 2023-10-07 DIAGNOSIS — E78.5 DYSLIPIDEMIA ASSOCIATED WITH TYPE 2 DIABETES MELLITUS (HCC): ICD-10-CM

## 2023-10-07 DIAGNOSIS — Z78.9 STATIN INTOLERANCE: ICD-10-CM

## 2023-10-09 RX ORDER — INSULIN GLARGINE 100 [IU]/ML
10 INJECTION, SOLUTION SUBCUTANEOUS EVERY MORNING
Qty: 15 ML | Refills: 1 | Status: SHIPPED | OUTPATIENT
Start: 2023-10-09

## 2023-10-09 NOTE — TELEPHONE ENCOUNTER
Requested Refill:   Requested Prescriptions     Pending Prescriptions Disp Refills    LANTUS SOLOSTAR 100 UNIT/ML injection pen [Pharmacy Med Name: LANTUS SOLOSTAR 100 UNIT/ML] 15 mL      Sig: INJECT 10 UNITS INTO THE SKIN EVERY MORNING       Last refilled:  6/8/2023 # 1 pen no refills     Last Appt: 9/19/2023  Next Appt: 12/19/2023

## 2023-12-10 DIAGNOSIS — I10 ESSENTIAL HYPERTENSION: ICD-10-CM

## 2023-12-10 DIAGNOSIS — E83.52 HYPERCALCEMIA: ICD-10-CM

## 2023-12-10 DIAGNOSIS — E11.65 UNCONTROLLED TYPE 2 DIABETES MELLITUS WITH HYPERGLYCEMIA (HCC): ICD-10-CM

## 2023-12-10 DIAGNOSIS — E11.42 TYPE 2 DIABETES MELLITUS WITH PERIPHERAL NEUROPATHY (HCC): ICD-10-CM

## 2023-12-11 RX ORDER — METFORMIN HYDROCHLORIDE 500 MG/1
TABLET, EXTENDED RELEASE ORAL
Qty: 120 TABLET | OUTPATIENT
Start: 2023-12-11

## 2023-12-19 DIAGNOSIS — I10 ESSENTIAL HYPERTENSION: ICD-10-CM

## 2023-12-19 DIAGNOSIS — E83.52 HYPERCALCEMIA: ICD-10-CM

## 2023-12-19 DIAGNOSIS — E11.69 DYSLIPIDEMIA ASSOCIATED WITH TYPE 2 DIABETES MELLITUS (HCC): ICD-10-CM

## 2023-12-19 DIAGNOSIS — E55.9 VITAMIN D DEFICIENCY: ICD-10-CM

## 2023-12-19 DIAGNOSIS — E11.42 TYPE 2 DIABETES MELLITUS WITH PERIPHERAL NEUROPATHY (HCC): ICD-10-CM

## 2023-12-19 DIAGNOSIS — E78.5 DYSLIPIDEMIA ASSOCIATED WITH TYPE 2 DIABETES MELLITUS (HCC): ICD-10-CM

## 2023-12-19 DIAGNOSIS — E11.65 UNCONTROLLED TYPE 2 DIABETES MELLITUS WITH HYPERGLYCEMIA (HCC): ICD-10-CM

## 2023-12-19 DIAGNOSIS — Z78.9 STATIN INTOLERANCE: ICD-10-CM

## 2023-12-20 LAB
EST. AVERAGE GLUCOSE BLD GHB EST-MCNC: 174.3 MG/DL
HBA1C MFR BLD: 7.7 %

## 2024-01-10 ENCOUNTER — TELEPHONE (OUTPATIENT)
Dept: ENDOCRINOLOGY | Age: 74
End: 2024-01-10

## 2024-01-10 NOTE — TELEPHONE ENCOUNTER
Pt said most recent pen needles are garbage and she can not use them and she wants a new rx for another kind please

## 2024-01-18 ENCOUNTER — ENROLLMENT (OUTPATIENT)
Dept: PHARMACY | Facility: CLINIC | Age: 74
End: 2024-01-18

## 2024-04-23 ENCOUNTER — TELEPHONE (OUTPATIENT)
Dept: ENDOCRINOLOGY | Age: 74
End: 2024-04-23

## 2024-04-23 ENCOUNTER — OFFICE VISIT (OUTPATIENT)
Dept: ENDOCRINOLOGY | Age: 74
End: 2024-04-23
Payer: MEDICARE

## 2024-04-23 VITALS
HEIGHT: 62 IN | WEIGHT: 123.2 LBS | OXYGEN SATURATION: 98 % | BODY MASS INDEX: 22.67 KG/M2 | HEART RATE: 80 BPM | DIASTOLIC BLOOD PRESSURE: 81 MMHG | SYSTOLIC BLOOD PRESSURE: 156 MMHG

## 2024-04-23 DIAGNOSIS — E78.5 DYSLIPIDEMIA ASSOCIATED WITH TYPE 2 DIABETES MELLITUS (HCC): ICD-10-CM

## 2024-04-23 DIAGNOSIS — E11.69 DYSLIPIDEMIA ASSOCIATED WITH TYPE 2 DIABETES MELLITUS (HCC): ICD-10-CM

## 2024-04-23 DIAGNOSIS — E11.65 UNCONTROLLED TYPE 2 DIABETES MELLITUS WITH HYPERGLYCEMIA (HCC): ICD-10-CM

## 2024-04-23 DIAGNOSIS — I10 ESSENTIAL HYPERTENSION: ICD-10-CM

## 2024-04-23 DIAGNOSIS — E83.52 HYPERCALCEMIA: ICD-10-CM

## 2024-04-23 DIAGNOSIS — T46.6X5A ADVERSE EFFECT OF STATIN: ICD-10-CM

## 2024-04-23 DIAGNOSIS — E55.9 VITAMIN D DEFICIENCY: ICD-10-CM

## 2024-04-23 DIAGNOSIS — E11.42 TYPE 2 DIABETES MELLITUS WITH PERIPHERAL NEUROPATHY (HCC): ICD-10-CM

## 2024-04-23 DIAGNOSIS — Z78.9 STATIN INTOLERANCE: ICD-10-CM

## 2024-04-23 DIAGNOSIS — E11.42 TYPE 2 DIABETES MELLITUS WITH PERIPHERAL NEUROPATHY (HCC): Primary | ICD-10-CM

## 2024-04-23 PROCEDURE — 3078F DIAST BP <80 MM HG: CPT | Performed by: INTERNAL MEDICINE

## 2024-04-23 PROCEDURE — G2211 COMPLEX E/M VISIT ADD ON: HCPCS | Performed by: INTERNAL MEDICINE

## 2024-04-23 PROCEDURE — G8400 PT W/DXA NO RESULTS DOC: HCPCS | Performed by: INTERNAL MEDICINE

## 2024-04-23 PROCEDURE — 1090F PRES/ABSN URINE INCON ASSESS: CPT | Performed by: INTERNAL MEDICINE

## 2024-04-23 PROCEDURE — 2022F DILAT RTA XM EVC RTNOPTHY: CPT | Performed by: INTERNAL MEDICINE

## 2024-04-23 PROCEDURE — 1036F TOBACCO NON-USER: CPT | Performed by: INTERNAL MEDICINE

## 2024-04-23 PROCEDURE — G8420 CALC BMI NORM PARAMETERS: HCPCS | Performed by: INTERNAL MEDICINE

## 2024-04-23 PROCEDURE — 1123F ACP DISCUSS/DSCN MKR DOCD: CPT | Performed by: INTERNAL MEDICINE

## 2024-04-23 PROCEDURE — 3046F HEMOGLOBIN A1C LEVEL >9.0%: CPT | Performed by: INTERNAL MEDICINE

## 2024-04-23 PROCEDURE — 3017F COLORECTAL CA SCREEN DOC REV: CPT | Performed by: INTERNAL MEDICINE

## 2024-04-23 PROCEDURE — 99214 OFFICE O/P EST MOD 30 MIN: CPT | Performed by: INTERNAL MEDICINE

## 2024-04-23 PROCEDURE — 3077F SYST BP >= 140 MM HG: CPT | Performed by: INTERNAL MEDICINE

## 2024-04-23 PROCEDURE — G8427 DOCREV CUR MEDS BY ELIG CLIN: HCPCS | Performed by: INTERNAL MEDICINE

## 2024-04-23 RX ORDER — INSULIN GLARGINE 100 [IU]/ML
11 INJECTION, SOLUTION SUBCUTANEOUS EVERY MORNING
Qty: 15 ML | Refills: 1 | Status: SHIPPED | OUTPATIENT
Start: 2024-04-23

## 2024-04-23 RX ORDER — MOXIFLOXACIN 5 MG/ML
SOLUTION/ DROPS OPHTHALMIC
COMMUNITY
Start: 2024-04-16

## 2024-04-23 RX ORDER — CALCIUM CITRATE/VITAMIN D3 200MG-6.25
2 TABLET ORAL DAILY
Qty: 100 EACH | Refills: 3 | Status: SHIPPED | OUTPATIENT
Start: 2024-04-23

## 2024-04-23 RX ORDER — PREDNISOLONE ACETATE 10 MG/ML
1 SUSPENSION/ DROPS OPHTHALMIC
COMMUNITY
Start: 2024-04-16

## 2024-04-23 RX ORDER — DORZOLAMIDE HYDROCHLORIDE AND TIMOLOL MALEATE 20; 5 MG/ML; MG/ML
1 SOLUTION/ DROPS OPHTHALMIC 2 TIMES DAILY
COMMUNITY
Start: 2024-03-21

## 2024-04-23 RX ORDER — LIDOCAINE 50 MG/G
1 PATCH TOPICAL EVERY 24 HOURS
COMMUNITY
Start: 2024-03-29
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
## 2024-04-23 NOTE — TELEPHONE ENCOUNTER
Call from pt stating that she spoke with her insurance provider and they informed her that they cover the Accu -Chek and True Metrics but both requires a PA

## 2024-04-24 ENCOUNTER — TELEPHONE (OUTPATIENT)
Dept: ENDOCRINOLOGY | Age: 74
End: 2024-04-24

## 2024-04-24 LAB
EST. AVERAGE GLUCOSE BLD GHB EST-MCNC: 174.3 MG/DL
HBA1C MFR BLD: 7.7 %

## 2024-04-24 NOTE — TELEPHONE ENCOUNTER
----- Message from Melissa Aviles MD sent at 4/24/2024  9:06 AM EDT -----  Please inform A1C is stable at 7.7%

## 2024-04-24 NOTE — TELEPHONE ENCOUNTER
Submitted PA for True Metrix test strips  Via [facility]: [id number]    STATUS: Available without authorization.

## 2024-04-29 ENCOUNTER — TELEPHONE (OUTPATIENT)
Dept: ENDOCRINOLOGY | Age: 74
End: 2024-04-29

## 2024-04-29 RX ORDER — LANCETS 33 GAUGE
2 EACH MISCELLANEOUS DAILY
Qty: 50 EACH | Refills: 5 | Status: SHIPPED | OUTPATIENT
Start: 2024-04-29

## 2024-04-29 NOTE — TELEPHONE ENCOUNTER
Pt calling and states she still needs the lancets for her True Metrix Meter. She states she already has the meter and test strips but is missing the lancets    Pharmacy [facility] on [facility] in [place]    [phone] (Home Phone)

## 2024-06-03 DIAGNOSIS — E11.42 TYPE 2 DIABETES MELLITUS WITH PERIPHERAL NEUROPATHY (HCC): ICD-10-CM

## 2024-06-03 DIAGNOSIS — I10 ESSENTIAL HYPERTENSION: ICD-10-CM

## 2024-06-03 DIAGNOSIS — E11.69 DYSLIPIDEMIA ASSOCIATED WITH TYPE 2 DIABETES MELLITUS (HCC): ICD-10-CM

## 2024-06-03 DIAGNOSIS — E55.9 VITAMIN D DEFICIENCY: ICD-10-CM

## 2024-06-03 DIAGNOSIS — E78.5 DYSLIPIDEMIA ASSOCIATED WITH TYPE 2 DIABETES MELLITUS (HCC): ICD-10-CM

## 2024-06-03 DIAGNOSIS — E11.65 UNCONTROLLED TYPE 2 DIABETES MELLITUS WITH HYPERGLYCEMIA (HCC): ICD-10-CM

## 2024-06-03 DIAGNOSIS — E83.52 HYPERCALCEMIA: ICD-10-CM

## 2024-06-03 DIAGNOSIS — Z78.9 STATIN INTOLERANCE: ICD-10-CM

## 2024-06-03 RX ORDER — INSULIN GLARGINE 100 [IU]/ML
INJECTION, SOLUTION SUBCUTANEOUS
Qty: 9 ML | Refills: 1 | Status: SHIPPED | OUTPATIENT
Start: 2024-06-03

## 2024-06-03 NOTE — TELEPHONE ENCOUNTER
Requested Prescriptions     Pending Prescriptions Disp Refills    LANTUS SOLOSTAR 100 UNIT/ML injection pen [Pharmacy Med Name: LANTUS SOLOSTAR 100 UNIT/ML] 9 mL      Sig: INJECT 10 UNITS UNDER THE SKIN EVERY MORNING       [facility] [id number] [facility] [place] - [address] - P [phone] - F [phone]  [address] [facility] [facility] [address]  Phone: [phone] Fax: [phone]    Last OV 04/23/2024  Next OV 07/24/2024          Lantus 10 units daily in am

## 2024-06-17 ENCOUNTER — TELEPHONE (OUTPATIENT)
Dept: ENDOCRINOLOGY | Age: 74
End: 2024-06-17

## 2024-06-17 DIAGNOSIS — E11.65 UNCONTROLLED TYPE 2 DIABETES MELLITUS WITH HYPERGLYCEMIA (HCC): ICD-10-CM

## 2024-06-17 DIAGNOSIS — E83.52 HYPERCALCEMIA: ICD-10-CM

## 2024-06-17 DIAGNOSIS — I10 ESSENTIAL HYPERTENSION: ICD-10-CM

## 2024-06-17 DIAGNOSIS — E11.42 TYPE 2 DIABETES MELLITUS WITH PERIPHERAL NEUROPATHY (HCC): ICD-10-CM

## 2024-06-17 RX ORDER — METFORMIN HYDROCHLORIDE 500 MG/1
1000 TABLET, EXTENDED RELEASE ORAL
Qty: 180 TABLET | Refills: 0 | Status: SHIPPED | OUTPATIENT
Start: 2024-06-17

## 2024-06-17 NOTE — TELEPHONE ENCOUNTER
Pt asking for a refill pt states she has about 5 days left pharmacy states no refills       metFORMIN (GLUCOPHAGE-XR) 500 MG extended release tablet     [id number] [place] [place] [facility] [address] -  [phone] - F [phone]  [address], [place] [address]  Phone: [phone]  Fax: [phone]

## 2024-06-17 NOTE — TELEPHONE ENCOUNTER
Medication:   Requested Prescriptions     Pending Prescriptions Disp Refills    metFORMIN (GLUCOPHAGE-XR) 500 MG extended release tablet 180 tablet 0     Sig: Take 2 tablets by mouth Daily with supper       Last Filled:      Patient Phone Number: [phone] (home)     Last appt: 4/23/2024   Next appt: 7/24/2024    Last Labs DM:   Lab Results   Component Value Date/Time    LABA1C 7.7 04/23/2024 11:06 AM

## 2024-07-24 ENCOUNTER — OFFICE VISIT (OUTPATIENT)
Dept: ENDOCRINOLOGY | Age: 74
End: 2024-07-24
Payer: MEDICARE

## 2024-07-24 VITALS
DIASTOLIC BLOOD PRESSURE: 85 MMHG | SYSTOLIC BLOOD PRESSURE: 164 MMHG | HEART RATE: 76 BPM | OXYGEN SATURATION: 99 % | HEIGHT: 62 IN | WEIGHT: 123.2 LBS | BODY MASS INDEX: 22.67 KG/M2

## 2024-07-24 DIAGNOSIS — E11.42 TYPE 2 DIABETES MELLITUS WITH PERIPHERAL NEUROPATHY (HCC): ICD-10-CM

## 2024-07-24 DIAGNOSIS — E11.65 UNCONTROLLED TYPE 2 DIABETES MELLITUS WITH HYPERGLYCEMIA (HCC): Primary | ICD-10-CM

## 2024-07-24 DIAGNOSIS — E55.9 VITAMIN D DEFICIENCY: ICD-10-CM

## 2024-07-24 DIAGNOSIS — I10 ESSENTIAL HYPERTENSION: ICD-10-CM

## 2024-07-24 DIAGNOSIS — E11.69 DYSLIPIDEMIA ASSOCIATED WITH TYPE 2 DIABETES MELLITUS (HCC): ICD-10-CM

## 2024-07-24 DIAGNOSIS — E78.5 DYSLIPIDEMIA ASSOCIATED WITH TYPE 2 DIABETES MELLITUS (HCC): ICD-10-CM

## 2024-07-24 DIAGNOSIS — Z78.9 STATIN INTOLERANCE: ICD-10-CM

## 2024-07-24 DIAGNOSIS — E11.65 UNCONTROLLED TYPE 2 DIABETES MELLITUS WITH HYPERGLYCEMIA (HCC): ICD-10-CM

## 2024-07-24 DIAGNOSIS — E83.52 HYPERCALCEMIA: ICD-10-CM

## 2024-07-24 LAB
EST. AVERAGE GLUCOSE BLD GHB EST-MCNC: 168.6 MG/DL
HBA1C MFR BLD: 7.5 %

## 2024-07-24 PROCEDURE — 1036F TOBACCO NON-USER: CPT | Performed by: INTERNAL MEDICINE

## 2024-07-24 PROCEDURE — 1090F PRES/ABSN URINE INCON ASSESS: CPT | Performed by: INTERNAL MEDICINE

## 2024-07-24 PROCEDURE — 3051F HG A1C>EQUAL 7.0%<8.0%: CPT | Performed by: INTERNAL MEDICINE

## 2024-07-24 PROCEDURE — 3079F DIAST BP 80-89 MM HG: CPT | Performed by: INTERNAL MEDICINE

## 2024-07-24 PROCEDURE — 3077F SYST BP >= 140 MM HG: CPT | Performed by: INTERNAL MEDICINE

## 2024-07-24 PROCEDURE — G8420 CALC BMI NORM PARAMETERS: HCPCS | Performed by: INTERNAL MEDICINE

## 2024-07-24 PROCEDURE — G2211 COMPLEX E/M VISIT ADD ON: HCPCS | Performed by: INTERNAL MEDICINE

## 2024-07-24 PROCEDURE — G8400 PT W/DXA NO RESULTS DOC: HCPCS | Performed by: INTERNAL MEDICINE

## 2024-07-24 PROCEDURE — 1123F ACP DISCUSS/DSCN MKR DOCD: CPT | Performed by: INTERNAL MEDICINE

## 2024-07-24 PROCEDURE — 3017F COLORECTAL CA SCREEN DOC REV: CPT | Performed by: INTERNAL MEDICINE

## 2024-07-24 PROCEDURE — 2022F DILAT RTA XM EVC RTNOPTHY: CPT | Performed by: INTERNAL MEDICINE

## 2024-07-24 PROCEDURE — G8427 DOCREV CUR MEDS BY ELIG CLIN: HCPCS | Performed by: INTERNAL MEDICINE

## 2024-07-24 PROCEDURE — 99214 OFFICE O/P EST MOD 30 MIN: CPT | Performed by: INTERNAL MEDICINE

## 2024-07-24 RX ORDER — METFORMIN HYDROCHLORIDE 500 MG/1
1000 TABLET, EXTENDED RELEASE ORAL
Qty: 180 TABLET | Refills: 0 | Status: SHIPPED | OUTPATIENT
Start: 2024-07-24

## 2024-07-24 RX ORDER — GLIPIZIDE 5 MG/1
5 TABLET ORAL
Qty: 180 TABLET | Refills: 1 | Status: SHIPPED | OUTPATIENT
Start: 2024-07-24

## 2024-07-24 RX ORDER — INSULIN GLARGINE 100 [IU]/ML
12 INJECTION, SOLUTION SUBCUTANEOUS EVERY MORNING
Qty: 5 ADJUSTABLE DOSE PRE-FILLED PEN SYRINGE | Refills: 1 | Status: SHIPPED | OUTPATIENT
Start: 2024-07-24

## 2024-07-24 NOTE — PROGRESS NOTES
Never had kidney stones   10.1 -11.4 since Jan 2020   Ca: 10.8, PTH 23 April 2021   Vit D 69 - April 2021 .    Ca 10.3, PTH 36.6, Vit D 42 - Oct 2021   Ca 9.7 - March 2022    Off Vit D 5,000 units three days a week      Ca 10, Vit D 48 - Oct 2022      Ca normal, Vit D 48 - June 2023   MVT daily     A1C today     RTC in 3-4 months with logs     She does labs at [facility] or [facility]     EDUCATION:   Greater than 50% of this visit was spent in general counseling regarding obesity, diet, exercise, importance of adherence to insulin regime, recognition and treatment of hypo and hyperglycemia,  glucose logging, proper diabetes management, diabetic complications with poor management and the importance of glycemic control in order to avoid the complications of diabetes.    Risks and potential complications of diabetes were reviewed with the patient.  Diabetes health maintenance plan and follow-up were discussed and understood by the patient.  We reviewed the importance of medication compliance and regular follow-up.   Aggressive lifestyle modification was encouraged.     Exercise Counselling:  This patient is a candidate for regular physical exercise. Instructions to perform the following types of exercise:  Swimming or water aerobic exercise  Brisk walking  Playing tennis  Stationary bicycle or elliptical indoor  Low impact aerobic exercise    Instructions given to exercise for the following duration:  30 minutes a day for five-seven days per week.    Following instructions for being active throughout the day in addition to formal exercise:  Walk instead of drive whenever possible  Take the stairs instead of the elevator  Work in the garden  Park to the far end of the parking lot to add more walking steps to destination      Electronically signed by CAPO ALLEN MD on 7/24/2024 at 9:57 AM

## 2024-07-25 ENCOUNTER — TELEPHONE (OUTPATIENT)
Dept: ENDOCRINOLOGY | Age: 74
End: 2024-07-25

## 2024-07-25 NOTE — TELEPHONE ENCOUNTER
----- Message from Melissa Aviles MD sent at 7/25/2024  8:50 AM EDT -----  Please inform A1C is stable at 7.5%

## 2024-09-23 ENCOUNTER — TELEPHONE (OUTPATIENT)
Dept: ENDOCRINOLOGY | Age: 74
End: 2024-09-23

## 2024-10-24 ENCOUNTER — OFFICE VISIT (OUTPATIENT)
Dept: ENDOCRINOLOGY | Age: 74
End: 2024-10-24

## 2024-10-24 VITALS
DIASTOLIC BLOOD PRESSURE: 77 MMHG | HEART RATE: 86 BPM | SYSTOLIC BLOOD PRESSURE: 148 MMHG | BODY MASS INDEX: 23 KG/M2 | OXYGEN SATURATION: 98 % | HEIGHT: 62 IN | WEIGHT: 125 LBS

## 2024-10-24 DIAGNOSIS — I10 ESSENTIAL HYPERTENSION: ICD-10-CM

## 2024-10-24 DIAGNOSIS — Z78.9 STATIN INTOLERANCE: ICD-10-CM

## 2024-10-24 DIAGNOSIS — E83.52 HYPERCALCEMIA: ICD-10-CM

## 2024-10-24 DIAGNOSIS — E11.69 DYSLIPIDEMIA ASSOCIATED WITH TYPE 2 DIABETES MELLITUS (HCC): ICD-10-CM

## 2024-10-24 DIAGNOSIS — E55.9 VITAMIN D DEFICIENCY: ICD-10-CM

## 2024-10-24 DIAGNOSIS — E78.5 DYSLIPIDEMIA ASSOCIATED WITH TYPE 2 DIABETES MELLITUS (HCC): ICD-10-CM

## 2024-10-24 DIAGNOSIS — E11.42 TYPE 2 DIABETES MELLITUS WITH PERIPHERAL NEUROPATHY (HCC): Primary | ICD-10-CM

## 2024-10-24 DIAGNOSIS — E11.42 TYPE 2 DIABETES MELLITUS WITH PERIPHERAL NEUROPATHY (HCC): ICD-10-CM

## 2024-10-24 LAB
25(OH)D3 SERPL-MCNC: 46 NG/ML
ALBUMIN SERPL-MCNC: 4.5 G/DL (ref 3.4–5)
ALBUMIN/GLOB SERPL: 1.8 {RATIO} (ref 1.1–2.2)
ALP SERPL-CCNC: 113 U/L (ref 40–129)
ALT SERPL-CCNC: 25 U/L (ref 10–40)
ANION GAP SERPL CALCULATED.3IONS-SCNC: 12 MMOL/L (ref 3–16)
AST SERPL-CCNC: 26 U/L (ref 15–37)
BILIRUB SERPL-MCNC: <0.2 MG/DL (ref 0–1)
BUN SERPL-MCNC: 20 MG/DL (ref 7–20)
CALCIUM SERPL-MCNC: 10.6 MG/DL (ref 8.3–10.6)
CHLORIDE SERPL-SCNC: 99 MMOL/L (ref 99–110)
CO2 SERPL-SCNC: 25 MMOL/L (ref 21–32)
CREAT SERPL-MCNC: 1 MG/DL (ref 0.6–1.2)
GFR SERPLBLD CREATININE-BSD FMLA CKD-EPI: 58 ML/MIN/{1.73_M2}
GLUCOSE SERPL-MCNC: 163 MG/DL (ref 70–99)
POTASSIUM SERPL-SCNC: 4.3 MMOL/L (ref 3.5–5.1)
PROT SERPL-MCNC: 7 G/DL (ref 6.4–8.2)
SODIUM SERPL-SCNC: 136 MMOL/L (ref 136–145)

## 2024-10-25 ENCOUNTER — TELEPHONE (OUTPATIENT)
Dept: ENDOCRINOLOGY | Age: 74
End: 2024-10-25

## 2024-10-25 LAB
EST. AVERAGE GLUCOSE BLD GHB EST-MCNC: 171.4 MG/DL
HBA1C MFR BLD: 7.6 %

## 2024-10-25 NOTE — TELEPHONE ENCOUNTER
----- Message from Dr. Melissa Aviles MD sent at 10/25/2024 10:45 AM EDT -----  Please inform A1C is stable at 7.6%   Vit D is good   Electrolytes are ok

## 2024-11-04 ENCOUNTER — TELEPHONE (OUTPATIENT)
Dept: ENDOCRINOLOGY | Age: 74
End: 2024-11-04

## 2024-11-04 NOTE — TELEPHONE ENCOUNTER
Pt stated she went to her family doctor on 11/1/2024 changed her script for glipizide from 5mg to 10mg.  She is concerned about why her other doctor raised it. Can you please reach out and advise what you want the pt to do, or guide her in the right direction.  She stated her sugars have been running really good. Pt ph [phone]

## 2024-12-07 DIAGNOSIS — E11.42 TYPE 2 DIABETES MELLITUS WITH PERIPHERAL NEUROPATHY (HCC): ICD-10-CM

## 2024-12-07 DIAGNOSIS — E11.65 UNCONTROLLED TYPE 2 DIABETES MELLITUS WITH HYPERGLYCEMIA (HCC): ICD-10-CM

## 2024-12-07 DIAGNOSIS — E55.9 VITAMIN D DEFICIENCY: ICD-10-CM

## 2024-12-07 DIAGNOSIS — Z78.9 STATIN INTOLERANCE: ICD-10-CM

## 2024-12-07 DIAGNOSIS — E11.69 DYSLIPIDEMIA ASSOCIATED WITH TYPE 2 DIABETES MELLITUS (HCC): ICD-10-CM

## 2024-12-07 DIAGNOSIS — E83.52 HYPERCALCEMIA: ICD-10-CM

## 2024-12-07 DIAGNOSIS — E78.5 DYSLIPIDEMIA ASSOCIATED WITH TYPE 2 DIABETES MELLITUS (HCC): ICD-10-CM

## 2024-12-07 DIAGNOSIS — I10 ESSENTIAL HYPERTENSION: ICD-10-CM

## 2024-12-09 RX ORDER — INSULIN GLARGINE 100 [IU]/ML
12 INJECTION, SOLUTION SUBCUTANEOUS EVERY MORNING
Qty: 9 ML | Refills: 2 | Status: SHIPPED | OUTPATIENT
Start: 2024-12-09

## 2024-12-09 NOTE — TELEPHONE ENCOUNTER
Requested Prescriptions     Pending Prescriptions Disp Refills    LANTUS SOLOSTAR 100 UNIT/ML injection pen [Pharmacy Med Name: LANTUS SOLOSTAR 100 UNIT/ML] 9 mL      Sig: INJECT 11 UNITS UNDER THE SKIN EVERY MORNING     Last refilled: 7/24/2024 # 5 pens with 1 refill    Lov:10/24/2024  Nov:2/27/2025

## 2025-01-02 RX ORDER — CALCIUM CITRATE/VITAMIN D3 200MG-6.25
TABLET ORAL
Qty: 100 STRIP | Refills: 0 | Status: SHIPPED | OUTPATIENT
Start: 2025-01-02

## 2025-01-02 NOTE — TELEPHONE ENCOUNTER
Medication:   Requested Prescriptions     Pending Prescriptions Disp Refills    blood glucose test strips (TRUE METRIX BLOOD GLUCOSE TEST) strip [Pharmacy Med Name: TRUE METRIX GLUCOSE TEST STRIP] 100 strip 0     Sig: USE TO TEST TWICE A DAY  AS NEEDED       Last Filled:  4/23/24    Patient Phone Number: [phone] (home)     Last appt: 10/24/2024   Next appt: 2/27/2025    Last Labs DM:   Lab Results   Component Value Date/Time    LABA1C 7.6 10/24/2024 01:00 PM

## 2025-01-14 ENCOUNTER — TELEPHONE (OUTPATIENT)
Dept: ENDOCRINOLOGY | Age: 75
End: 2025-01-14

## 2025-01-14 NOTE — TELEPHONE ENCOUNTER
Call from pt stating that her BS was 400 yesterday night 1/13/25. She stated that she checked her BS every 2 hrs and it went down to 160     Pt stated that this morning her BS was 179 this morning and currently it is 260.   She stated that she didn't eat much today    She is wanting to know what Dr. Aviles would advise her to do?    NOV is 2/27/25    Please advise   # [phone]

## 2025-01-15 NOTE — TELEPHONE ENCOUNTER
Cut down the carbs   Increase hydration and have regular exercise   If she received any steroids then avoid it next time

## 2025-01-15 NOTE — TELEPHONE ENCOUNTER
Called and spoke with pt about where blood sugar's have been running at.    Pt's Blood Sugar's    1/10/25- Breakfast 113,Dinner- 120    1/11/25- Breakfast-97,Dinner-218    1/12/20252593-Gjbuubdyu-416,Bedtime-150    1/13/20255007-Eqmnfmmuj-776,Bedtime-400    1/14/20258005-Dcxbywnkv-253,Dinner-299,Bedtime-172    1/15/7855-Lmolmgdbd-723    Pt has been taking Lantus at 12 units every morning.

## 2025-02-27 ENCOUNTER — OFFICE VISIT (OUTPATIENT)
Dept: ENDOCRINOLOGY | Age: 75
End: 2025-02-27

## 2025-02-27 VITALS
OXYGEN SATURATION: 97 % | DIASTOLIC BLOOD PRESSURE: 64 MMHG | WEIGHT: 124.8 LBS | BODY MASS INDEX: 22.97 KG/M2 | HEART RATE: 78 BPM | HEIGHT: 62 IN | SYSTOLIC BLOOD PRESSURE: 125 MMHG

## 2025-02-27 DIAGNOSIS — E78.5 DYSLIPIDEMIA ASSOCIATED WITH TYPE 2 DIABETES MELLITUS (HCC): ICD-10-CM

## 2025-02-27 DIAGNOSIS — I10 ESSENTIAL HYPERTENSION: ICD-10-CM

## 2025-02-27 DIAGNOSIS — E11.42 TYPE 2 DIABETES MELLITUS WITH PERIPHERAL NEUROPATHY (HCC): ICD-10-CM

## 2025-02-27 DIAGNOSIS — E11.42 TYPE 2 DIABETES MELLITUS WITH PERIPHERAL NEUROPATHY (HCC): Primary | ICD-10-CM

## 2025-02-27 DIAGNOSIS — E11.69 DYSLIPIDEMIA ASSOCIATED WITH TYPE 2 DIABETES MELLITUS (HCC): ICD-10-CM

## 2025-02-27 DIAGNOSIS — E83.52 HYPERCALCEMIA: ICD-10-CM

## 2025-02-27 DIAGNOSIS — E55.9 VITAMIN D DEFICIENCY: ICD-10-CM

## 2025-02-27 DIAGNOSIS — E11.65 UNCONTROLLED TYPE 2 DIABETES MELLITUS WITH HYPERGLYCEMIA (HCC): ICD-10-CM

## 2025-02-27 DIAGNOSIS — Z78.9 STATIN INTOLERANCE: ICD-10-CM

## 2025-02-27 LAB
EST. AVERAGE GLUCOSE BLD GHB EST-MCNC: 177.2 MG/DL
HBA1C MFR BLD: 7.8 %

## 2025-02-27 RX ORDER — EZETIMIBE 10 MG/1
10 TABLET ORAL DAILY
COMMUNITY

## 2025-02-27 RX ORDER — INSULIN GLARGINE 100 [IU]/ML
10 INJECTION, SOLUTION SUBCUTANEOUS EVERY MORNING
Qty: 9 ML | Refills: 2 | Status: SHIPPED | OUTPATIENT
Start: 2025-02-27

## 2025-02-27 NOTE — PROGRESS NOTES
Patient ID:   Rashmi Das is a 74 y.o. female    Chief Complaint:   Rashmi Das presents for an evaluation of Type 2 Diabetes Mellitus , Hyperlipidemia and hypertension.     Subjective:   Type 2 Diabetes Mellitus diagnosed in .   Insulin started 2023   Invokana caused confusion   Actos confused confusion   Januvia caused confusion   Trulicity caused hives, breathing issues     Not happy with diabetes care with pcp   Recently fired her PCP      Interval history:      High stress as son is addict and in correction right now   He is under stress as he is constantly asking for me     Metformin ER 500mg, two tabs with dinner. Higher dose caused diarrhea, it was going through. Riomet on backorders     Glipizide 5 mg with breakfast and dinner. XL did not work .     Lantus 12 units daily in am. Denies missing doses      She does not have help for meals   She was suppose to get meals on wheels       Checks blood sugars 0-2 times per day.  Reviewed logs    AM: 70, 80,   Lunch:  Supper:   HS: 112-223    Hypoglycemias: None     Meals: three (dinner is bigger), crackers at bedtime. Snacks 2 per day (crackers, sweets (sugar free cookies), pizza rolls). Sometimes glucerna drinks for snacks.    Drinks juices sometimes.  Sometimes she has diet sodas.   Exercise: She says she is always active.     Denies chest pain, exertional dyspnea.     CAD s/p CABG - Aug 2022   Family history of CAD: Brother  of MI at ag 56. Both parents had stroke.   Denies smoking/ alcohol.   Currently on ASA 81 mg daily     The following portions of the patient's history were reviewed and updated as appropriate:         Family History   Problem Relation Age of Onset    High Blood Pressure Mother     Stroke Mother     Stroke Father          Social History     Socioeconomic History    Marital status: Unknown     Spouse name: Not on file    Number of children: Not on file    Years of education: Not on file    Highest education level: Not on

## 2025-02-28 ENCOUNTER — TELEPHONE (OUTPATIENT)
Dept: ENDOCRINOLOGY | Age: 75
End: 2025-02-28

## 2025-02-28 NOTE — TELEPHONE ENCOUNTER
----- Message from Dr. Melissa Aviles MD sent at 2/28/2025  9:26 AM EST -----  Please inform A1C is a little higher at 7.8% . Overall under control

## 2025-03-04 ENCOUNTER — TELEPHONE (OUTPATIENT)
Dept: ENDOCRINOLOGY | Age: 75
End: 2025-03-04

## 2025-03-04 NOTE — TELEPHONE ENCOUNTER
Pt said she left her ins card at the lab there and lab called her they gave it to the FD at . Pt would like the ins card mailed and a call back to let her know it was mailed out

## 2025-05-14 ENCOUNTER — TELEPHONE (OUTPATIENT)
Dept: ENDOCRINOLOGY | Age: 75
End: 2025-05-14
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
## 2025-07-14 RX ORDER — PEN NEEDLE, DIABETIC 32GX 5/32"
NEEDLE, DISPOSABLE MISCELLANEOUS
Qty: 100 EACH | Refills: 3 | OUTPATIENT
Start: 2025-07-14

## 2025-07-14 NOTE — TELEPHONE ENCOUNTER
Requested Prescriptions     Pending Prescriptions Disp Refills    DROPLET PEN NEEDLES 32G X 4 MM MISC [Pharmacy Med Name: DROPLET PEN NEEDLE 32G 4MM] 100 each 3     Sig: USE 1 DAILY     Last refilled: 1/10/2024 # 100 with 3 refills    Lov: 2/27/2025  Cx: 5/27/2025

## 2025-07-28 RX ORDER — PEN NEEDLE, DIABETIC 32GX 5/32"
NEEDLE, DISPOSABLE MISCELLANEOUS
Qty: 100 EACH | Refills: 3 | OUTPATIENT
Start: 2025-07-28